# Patient Record
Sex: MALE | Race: WHITE | NOT HISPANIC OR LATINO | Employment: OTHER | URBAN - METROPOLITAN AREA
[De-identification: names, ages, dates, MRNs, and addresses within clinical notes are randomized per-mention and may not be internally consistent; named-entity substitution may affect disease eponyms.]

---

## 2018-02-28 ENCOUNTER — MEDICAL CORRESPONDENCE (OUTPATIENT)
Dept: HEALTH INFORMATION MANAGEMENT | Facility: CLINIC | Age: 63
End: 2018-02-28

## 2018-03-16 ENCOUNTER — TRANSFERRED RECORDS (OUTPATIENT)
Dept: HEALTH INFORMATION MANAGEMENT | Facility: CLINIC | Age: 63
End: 2018-03-16

## 2018-04-03 ENCOUNTER — TRANSFERRED RECORDS (OUTPATIENT)
Dept: HEALTH INFORMATION MANAGEMENT | Facility: CLINIC | Age: 63
End: 2018-04-03

## 2018-04-05 ENCOUNTER — OFFICE VISIT (OUTPATIENT)
Dept: NEUROSURGERY | Facility: CLINIC | Age: 63
End: 2018-04-05
Attending: NURSE PRACTITIONER
Payer: OTHER MISCELLANEOUS

## 2018-04-05 VITALS
DIASTOLIC BLOOD PRESSURE: 82 MMHG | OXYGEN SATURATION: 93 % | HEART RATE: 84 BPM | HEIGHT: 71 IN | BODY MASS INDEX: 32.9 KG/M2 | WEIGHT: 235 LBS | SYSTOLIC BLOOD PRESSURE: 135 MMHG

## 2018-04-05 DIAGNOSIS — M54.16 LUMBAR RADICULAR PAIN: Primary | ICD-10-CM

## 2018-04-05 PROCEDURE — 99243 OFF/OP CNSLTJ NEW/EST LOW 30: CPT | Performed by: NURSE PRACTITIONER

## 2018-04-05 PROCEDURE — G0463 HOSPITAL OUTPT CLINIC VISIT: HCPCS | Performed by: NURSE PRACTITIONER

## 2018-04-05 RX ORDER — MULTIPLE VITAMINS W/ MINERALS TAB 9MG-400MCG
1 TAB ORAL DAILY
COMMUNITY

## 2018-04-05 ASSESSMENT — PAIN SCALES - GENERAL: PAINLEVEL: MILD PAIN (2)

## 2018-04-05 NOTE — PROGRESS NOTES
Dr. Santino Epstein  [facility]  Neurosurgery Clinic Visit          CC: low back and left leg pain    Primary care Provider: Rayn Berg      Reason For Visit:   I was asked by Dr. Don to consult on the patient for lumbar radicular pain on the left.      HPI: Isaiah Miller is a 62 year old male with lumbar radicular pain on the left. He reports that it began on 2- when he was at work lifting a lateral file cabinet into a truck and he felt the pain.  He did finish out the work day.  He had progressive back and leg pain and went to see his PCP and a MRI was ordered.  He was then sent to PT which has helped somewhat.   He states that the back pain is more of a dull ache and the numbness of his leg is the most bothersome.  He notes numbness to the lateral portion of his entire left leg to the foot. At times the leg feels weak.  He has not had injections for his pain.      Pain at its worst 10  Pain right now:  2    History reviewed. No pertinent past medical history.    Past Medical History reviewed with patient during visit.    History reviewed. No pertinent surgical history.  Past Surgical History reviewed with patient during visit.    Current Outpatient Prescriptions   Medication     SIMVASTATIN PO     Metoprolol Succinate (TOPROL XL PO)     metoprolol-hydrochlorothiazide (DUTOPROL) 25-12.5 MG TB24 per tablet     multivitamin, therapeutic with minerals (MULTI-VITAMIN) TABS tablet     ASPIRIN PO     No current facility-administered medications for this visit.        Allergies   Allergen Reactions     Tamiflu [Oseltamivir] Rash       Social History     Social History     Marital status:      Spouse name: N/A     Number of children: N/A     Years of education: N/A     Social History Main Topics     Smoking status: Never Smoker     Smokeless tobacco: Never Used     Alcohol use None     Drug use: None     Sexual activity: Not Asked     Other Topics Concern     None      Social History Narrative     None       History reviewed. No pertinent family history.      Review Of Systems  Skin: negative  Eyes: negative  Ears/Nose/Throat: negative  Respiratory: No shortness of breath, dyspnea on exertion, cough, or hemoptysis  Cardiovascular: HTN/HLD  Gastrointestinal: negative  Genitourinary: negative  Musculoskeletal: back pain  Neurologic: left leg numbness  Psychiatric: negative  Hematologic/Lymphatic/Immunologic: negative  Endocrine: negative     ROS: 10 point ROS neg other than the symptoms noted above in the HPI.      Vital Signs: There were no vitals taken for this visit.    Examination:  Constitutional:  Alert, well nourished, NAD.  Memory: recent and remote memory intact  HEENT: Normocephalic, atraumatic.   Pulm:  Without shortness of breath   CV:  No pitting edema of BLE.    Neurological:  Awake  Alert  Oriented x 3  Speech clear  Cranial nerves II - XII intact  PERRL  EOMI  Face symmetric  Tongue midline  Motor exam   Shoulder Abduction:  Right:  5/5   Left:  5/5  Biceps:                      Right:  5/5   Left:  5/5  Triceps:                     Right:  5/5   Left:  5/5  Wrist Extensors:       Right:  5/5   Left:  5/5  Wrist Flexors:           Right:  5/5   Left:  5/5  Intrinsics:                   Right:  5/5   Left:  5/5   Hip Flexor:                Right: 5/5  Left:  5/5  Hip Adductor:             Right:  5/5  Left:  5/5  Hip Abductor:             Right:  5/5  Left:  5/5  Gastroc Soleus:        Right:  5/5  Left:  5/5  Tib/Ant:                      Right:  5/5  Left:  5/5  EHL:                          Right:  5/5  Left:  5/5   Sensation normal to bilateral upper and lower extremities  Muscle tone to bilateral upper and lower extremities  Normal   Gait: Able to stand from a seated position. Normal non-antalgic, non-myelopathic gait.  Able to heel/toe walk without loss of balance    Lumbar examination reveals no tenderness of the spine or paraspinous muscles. Restricted and  painful ROM in all planes.    Hip height is symmetrical. Negative SI joint, sciatic notch or greater trochanteric tenderness to palpation bilaterally.  Straight leg raise is positive on the left.      Imaging:     Lumbar MRI:    2-      1.  New L2-3 central disc extrusion which results in mild spinal canal stenosis.     2. Improvement in the L4-5 left foraminal disc extrusion on the prior exam with now moderate to severe foraminal stenosis.     3. Continued severe left foraminal stenosis at L5-S1.     Assessment/Plan:   Isaiah Miller is a 62 year old male with lumbar radicular pain on the left. He reports that it began on 2- when he was at work lifting a lateral file cabinet into a truck and he felt the pain.  He did finish out the work day.  He had progressive back and leg pain and went to see his PCP and a MRI was ordered.  He was then sent to PT which has helped somewhat.   He states that the back pain is more of a dull ache and the numbness of his leg is the most bothersome.  He notes numbness to the lateral portion of his entire left leg to the foot. At times the leg feels weak.  He has not had injections for his pain.  The pt is neurologically intact. His lumbar MRI was reviewed in detail.  The pts pain pattern does correlate with his lumbar MRI. He is feeling better he notes. At this time it was recommended he try injection therapy. He is open to this.      Patient Instructions   1. Please schedule your injection. Someone will contact you from the pain clinic within 24 hours to schedule.      2. Please contact the clinic if pain persists at [phone].     3.  Continue physical therapy          Nat Parr [facility]  [facility]  [address]  [address] [place] [address]    Tel [phone]  Pager [phone]

## 2018-04-05 NOTE — LETTER
4/5/2018         RE: Isaiah Miller  [address] [facility] [address]        Dear Colleague,    Thank you for referring your patient, Isaiah Miller, to the [facility]. Please see a copy of my visit note below.    Dr. Santino Epstein  [facility]  Neurosurgery Clinic Visit          CC: low back and left leg pain    Primary care Provider: Ryan Berg      Reason For Visit:   I was asked by Dr. Don to consult on the patient for lumbar radicular pain on the left.      HPI: Isaiah Miller is a 62 year old male with lumbar radicular pain on the left. He reports that it began on 2- when he was at work lifting a lateral file cabinet into a truck and he felt the pain.  He did finish out the work day.  He had progressive back and leg pain and went to see his PCP and a MRI was ordered.  He was then sent to PT which has helped somewhat.   He states that the back pain is more of a dull ache and the numbness of his leg is the most bothersome.  He notes numbness to the lateral portion of his entire left leg to the foot. At times the leg feels weak.  He has not had injections for his pain.      Pain at its worst 10  Pain right now:  2    History reviewed. No pertinent past medical history.    Past Medical History reviewed with patient during visit.    History reviewed. No pertinent surgical history.  Past Surgical History reviewed with patient during visit.    Current Outpatient Prescriptions   Medication     SIMVASTATIN PO     Metoprolol Succinate (TOPROL XL PO)     metoprolol-hydrochlorothiazide (DUTOPROL) 25-12.5 MG TB24 per tablet     multivitamin, therapeutic with minerals (MULTI-VITAMIN) TABS tablet     ASPIRIN PO     No current facility-administered medications for this visit.        Allergies   Allergen Reactions     Tamiflu [Oseltamivir] Rash       Social History     Social History     Marital status:      Spouse  name: N/A     Number of children: N/A     Years of education: N/A     Social History Main Topics     Smoking status: Never Smoker     Smokeless tobacco: Never Used     Alcohol use None     Drug use: None     Sexual activity: Not Asked     Other Topics Concern     None     Social History Narrative     None       History reviewed. No pertinent family history.      Review Of Systems  Skin: negative  Eyes: negative  Ears/Nose/Throat: negative  Respiratory: No shortness of breath, dyspnea on exertion, cough, or hemoptysis  Cardiovascular: HTN/HLD  Gastrointestinal: negative  Genitourinary: negative  Musculoskeletal: back pain  Neurologic: left leg numbness  Psychiatric: negative  Hematologic/Lymphatic/Immunologic: negative  Endocrine: negative     ROS: 10 point ROS neg other than the symptoms noted above in the HPI.      Vital Signs: There were no vitals taken for this visit.    Examination:  Constitutional:  Alert, well nourished, NAD.  Memory: recent and remote memory intact  HEENT: Normocephalic, atraumatic.   Pulm:  Without shortness of breath   CV:  No pitting edema of BLE.    Neurological:  Awake  Alert  Oriented x 3  Speech clear  Cranial nerves II - XII intact  PERRL  EOMI  Face symmetric  Tongue midline  Motor exam   Shoulder Abduction:  Right:  5/5   Left:  5/5  Biceps:                      Right:  5/5   Left:  5/5  Triceps:                     Right:  5/5   Left:  5/5  Wrist Extensors:       Right:  5/5   Left:  5/5  Wrist Flexors:           Right:  5/5   Left:  5/5  Intrinsics:                   Right:  5/5   Left:  5/5   Hip Flexor:                Right: 5/5  Left:  5/5  Hip Adductor:             Right:  5/5  Left:  5/5  Hip Abductor:             Right:  5/5  Left:  5/5  Gastroc Soleus:        Right:  5/5  Left:  5/5  Tib/Ant:                      Right:  5/5  Left:  5/5  EHL:                          Right:  5/5  Left:  5/5   Sensation normal to bilateral upper and lower extremities  Muscle tone to  bilateral upper and lower extremities  Normal   Gait: Able to stand from a seated position. Normal non-antalgic, non-myelopathic gait.  Able to heel/toe walk without loss of balance    Lumbar examination reveals no tenderness of the spine or paraspinous muscles. Restricted and painful ROM in all planes.    Hip height is symmetrical. Negative SI joint, sciatic notch or greater trochanteric tenderness to palpation bilaterally.  Straight leg raise is positive on the left.      Imaging:     Lumbar MRI:    2-      1.  New L2-3 central disc extrusion which results in mild spinal canal stenosis.     2. Improvement in the L4-5 left foraminal disc extrusion on the prior exam with now moderate to severe foraminal stenosis.     3. Continued severe left foraminal stenosis at L5-S1.     Assessment/Plan:   Isaiah Miller is a 62 year old male with lumbar radicular pain on the left. He reports that it began on 2- when he was at work lifting a lateral file cabinet into a truck and he felt the pain.  He did finish out the work day.  He had progressive back and leg pain and went to see his PCP and a MRI was ordered.  He was then sent to PT which has helped somewhat.   He states that the back pain is more of a dull ache and the numbness of his leg is the most bothersome.  He notes numbness to the lateral portion of his entire left leg to the foot. At times the leg feels weak.  He has not had injections for his pain.  The pt is neurologically intact. His lumbar MRI was reviewed in detail.  The pts pain pattern does correlate with his lumbar MRI. He is feeling better he notes. At this time it was recommended he try injection therapy. He is open to this.      Patient Instructions   1. Please schedule your injection. Someone will contact you from the pain clinic within 24 hours to schedule.      2. Please contact the clinic if pain persists at [phone].     3.  Continue physical therapy          Nat Parr  CNP  [facility]  [address] [address]    Tel [phone]  Pager [phone]      Again, thank you for allowing me to participate in the care of your patient.        Sincerely,        SURYA Mcnulty CNP

## 2018-04-05 NOTE — NURSING NOTE
"Isaiah Miller is a 62 year old male who presents for:  Chief Complaint   Patient presents with     Neurologic Problem     Low back pain which started in Feb while he was lifting something from his work truck         Initial Vitals:  /82 (BP Location: Right arm, Patient Position: Sitting, Cuff Size: Adult Large)  Pulse 84  Ht 5' 10.5\" (1.791 m)  Wt 235 lb (106.6 kg)  SpO2 93%  BMI 33.24 kg/m2 Estimated body mass index is 33.24 kg/(m^2) as calculated from the following:    Height as of this encounter: 5' 10.5\" (1.791 m).    Weight as of this encounter: 235 lb (106.6 kg).. Body surface area is 2.3 meters squared. BP completed using cuff size: large  Mild Pain (2)    Do you feel safe in your environment?  Yes  Do you need any refills today? No    Nursing Comments: Low back pain which started in Feb while he was lifting something from his work truck.        5 min. nursing intake time  Bethany Roach MA       Discharge plan: 1. Please schedule your injection. Someone will contact you from the pain clinic within 24 hours to schedule.      2. Please contact the clinic if pain persists at [phone].     3.  Continue physical therapy   2 min. nursing discharge time  Bethany Roach MA        "

## 2018-04-05 NOTE — MR AVS SNAPSHOT
After Visit Summary   4/5/2018    Isaiah Miller    MRN: [id number]           Patient Information     Date Of Birth          1955        Visit Information        Provider Department      4/5/2018 9:00 AM Nat Parr APRN CNP [facility]        Today's Diagnoses     Lumbar radicular pain    -  1      Care Instructions    1. Please schedule your injection. Someone will contact you from the pain clinic within 24 hours to schedule.      2. Please contact the clinic if pain persists at [phone].     3.  Continue physical therapy           Follow-ups after your visit        Additional Services     PAIN MANAGEMENT REFERRAL       Your provider has referred you to: [facility]: [facility] -  Dr. Roth   Reason for Referral: Procedure Order Epidural:  Lumbar (Advanced imaging required in the last 3 years)      What is your diagnosis for the patient's pain? Left leg and back pain       For any questions, contact the [facility] at [phone].     **ANY DIAGNOSTIC TESTS THAT ARE NOT IN EPIC SHOULD BE SENT TO THE PAIN CENTER**    REGARDING OPIOID MEDICATIONS:  The discussion of opioids management, appropriateness of therapy, and dosing will be discussed in patients being seen for evaluation.  The pain management clinics are not long-term prescribing clinics, with transition of prescribing of medications ultimately going back to the referring provider/PCP.  If prescribing is taken over at the pain clinic, it is in actively involved patients whom are appropriate for opioids, urine drug screening is completed, and long-term prescribing plan has been determined.  Therefore, we will not be automatically taking over prescribing at the patient's first visit.  Is this agreeable to you? agrees.     Please be aware that coverage of these services is subject to the terms and limitations of your health insurance plan.  Call member services at your  "health plan with any benefit or coverage questions.      Please bring the following with you to your appointment:    (1) Any X-Rays, CTs or MRIs which have been performed.  Contact the facility where they were done to arrange for  prior to your scheduled appointment.    (2) List of current medications   (3) This referral request   (4) Any documents/labs given to you for this referral                  Who to contact     If you have questions or need follow up information about today's clinic visit or your schedule please contact [facility] directly at [phone].  Normal or non-critical lab and imaging results will be communicated to you by [employer], letter or phone within 4 business days after the clinic has received the results. If you do not hear from us within 7 days, please contact the clinic through [employer] or phone. If you have a critical or abnormal lab result, we will notify you by phone as soon as possible.  Submit refill requests through [employer] or call your pharmacy and they will forward the refill request to us. Please allow 3 business days for your refill to be completed.          Additional Information About Your Visit        MyChart Information     [employer] lets you send messages to your doctor, view your test results, renew your prescriptions, schedule appointments and more. To sign up, go to [email or web address] . Click on \"Log in\" on the left side of the screen, which will take you to the Welcome page. Then click on \"Sign up Now\" on the right side of the page.     You will be asked to enter the access code listed below, as well as some personal information. Please follow the directions to create your username and password.     Your access code is: [email or web address]  Expires: 2018  9:18 AM     Your access code will  in 90 days. If you need help or a new code, please call your [facility] clinic or [phone].        Care EveryWhere ID     This is your Care " "EveryWhere ID. This could be used by other organizations to access your [facility] medical records  [id number]        Your Vitals Were     Height BMI (Body Mass Index)                5' 10.5\" (1.791 m) 33.24 kg/m2           Blood Pressure from Last 3 Encounters:   No data found for BP    Weight from Last 3 Encounters:   04/05/18 235 lb (106.6 kg)              We Performed the Following     PAIN MANAGEMENT REFERRAL        Primary Care Provider Office Phone # Fax #    Ryan Berg [phone]       [facility] [address] [facility] [address]        Equal Access to Services     [facility] Center[facility] : Hadii aad ku hadasho Soomaali, waaxda luqadaha, qaybta kaalmada adeegyada, [facility] hayaan adeswathi wall . So [facility] [phone].    ATENCIÓN: Si habla español, tiene a way disposición servicios gratuitos de asistencia lingüística. Wolfgang al [phone].    We comply with applicable federal civil rights laws and [place] laws. We do not discriminate on the basis of race, color, national origin, age, disability, sex, sexual orientation, or gender identity.            Thank you!     Thank you for choosing [facility]  for your care. Our goal is always to provide you with excellent care. Hearing back from our patients is one way we can continue to improve our services. Please take a few minutes to complete the written survey that you may receive in the mail after your visit with us. Thank you!             Your Updated Medication List - Protect others around you: Learn how to safely use, store and throw away your medicines at www.disposemymeds.org.          This list is accurate as of 4/5/18  9:18 AM.  Always use your most recent med list.                   Brand Name Dispense Instructions for use Diagnosis    ASPIRIN PO      Take 20 mg by mouth        metoprolol-hydrochlorothiazide 25-12.5 MG Tb24 per tablet    DUTOPROL     Take 1 tablet by mouth daily     "    Multi-vitamin Tabs tablet      Take 1 tablet by mouth daily        SIMVASTATIN PO      Take 40 mg by mouth At Bedtime        TOPROL XL PO      Take 25 mg by mouth daily

## 2018-04-05 NOTE — PATIENT INSTRUCTIONS
1. Please schedule your injection. Someone will contact you from the pain clinic within 24 hours to schedule.      2. Please contact the clinic if pain persists at [phone].     3.  Continue physical therapy

## 2018-04-06 ENCOUNTER — TELEPHONE (OUTPATIENT)
Dept: PALLIATIVE MEDICINE | Facility: CLINIC | Age: 63
End: 2018-04-06

## 2018-04-06 NOTE — TELEPHONE ENCOUNTER
Pre-screening Questions for Radiology Injections:    Injection to be done at which interventional clinic site? [facility]    Procedure ordered by Nat Parr    Procedure ordered? Lumbar Epidural Steroid Injection    What insurance would patient like us to bill for this procedure? Work Comp      Worker's comp or MVA (motor vehicle accident) -Any injection DO NOT SCHEDULE and route to Alicia Burch.      [employer] insurance - For SI joint injections, DO NOT SCHEDULE and route [email or web address] NO PA REQUIRED EFFECTIVE 11/1/2017      [employer]- MBB's must be scheduled at LEAST two weeks apart      [employer] - Any injection besides hip/shoulder/knee joint DO NOT SCHEDULE and route to Jazz Mckeon. She will obtain PA and call pt back to schedule procedure or notify pt of denial.       HP [employer]-PA REQUIRED FOR NON-ADAM OR Joint injections    Any chance of pregnancy? Not Applicable   If YES, do NOT schedule and route to RN pool    Is an  needed? No     Patient has a drive home? (mandatory) YES:     Is patient taking any blood thinners (plavix, coumadin, jantoven, warfarin, heparin, pradaxa or dabigatran )? No   If hold needed, do NOT schedule, route to RN pool     Is patient taking any aspirin products? Yes - Pt takes 81mg daily; instructed to hold 0 day(s) prior to procedure.      If more than 325mg/day do NOT schedule; route to RN pool     For CERVICAL procedures, hold all aspirin products for 6 days.      Does the patient have a bleeding or clotting disorder? No     If YES, okay to schedule AND route to RN nurse pool    **For any patients with platelet count <100, must be forwarded to provider**    Is patient diabetic?  No  If YES, have them bring their glucometer.    Does patient have an active infection or treated for one within the past week? No     Is patient currently taking any antibiotics?  No     For patients on chronic, preventative, or prophylactic  antibiotics, procedures may be scheduled.     For patients on antibiotics for active or recent infection:    Ravinder Lopez Burton, [place]-antibiotic course must have been completed for 4 days    Dr. Siegel-antibiotic course must have been completed for 7 days    Is patient currently taking any steroid medications? (i.e. Prednisone, Medrol)  No     For patients on steroid medications:    Ravinder Lopez Burton, Snitzer-steroid course must have been completed for 4 days    -steroid course must have been completed for 7 days    Reviewed with patient:  If you are started on any steroids or antibiotics between now and your appointment, you must contact us because it may affect our ability to perform your procedure.  Yes    Is patient actively being treated for cancer or immunocompromised? No  If YES, do NOT schedule and route to RN pool     Are you able to get on and off an exam table with minimal or no assistance? Yes  If NO, do NOT schedule and route to RN pool    Are you able to roll over and lay on your stomach with minimal or no assistance? Yes  If NO, do NOT schedule and route to RN pool     Any allergies to contrast dye, iodine, shellfish, or numbing and steroid medications? No  If YES, route to RN pool AND add allergy information to appointment notes    Allergies: Tamiflu [oseltamivir]      Has the patient had a flu shot or any other vaccinations within 7 days before or after the procedure.  No     Does patient have an MRI/CT?  YES: 2/26/18  (SI joint, hip injections, lumbar sympathetic blocks, and stellate ganglion blocks do not require an MRI)    Was the MRI done w/in the last 3 years?  Yes    Was MRI done at [facility]? No      If not, where was it done? [facility] in [place] (PT WILL BRING RECORDS TO [facility] [place])       If MRI was not done at [facility], [facility] do NOT schedule and route to nursing.  If pt has an imaging disc, the injection may be  scheduled but pt has to bring disc to appt. If they show up w/out disc the injection cannot be done    Reminders (please tell patient if applicable):       Instructed pt to arrive 30 minutes early for IV start if this is for a cervical procedure, ALL sympathetic (stellate ganglion, hypogastric, or lumbar sympathetic block) and all sedation procedures (RFA, spinal cord stimulation trials).  Not Applicable   -IVs are not routinely placed for Dr. Roth cervical cases   -Dr. Mejias: IVs for cervical ESIs and cervical TBDs (not CMBBs/facet inj)      If NPO for sedation, informed patient that it is okay to take medications with sips of water (except if they are to hold blood thinners).  Not Applicable   *DO take blood pressure medication if it is prescribed*      If this is for a cervical ADAM, informed patient that aspirin needs to be held for 6 days.   NO      For all patients not having spinal cord stimulator (SCS) trials or radiofrequency ablations (RFAs), informed patient:    IV sedation is not provided for this procedure.  If you feel that an oral anti-anxiety medication is needed, you can discuss this further with your referring provider or primary care provider.  The Pain Clinic provider will discuss specifics of what the procedure includes at your appointment.  Most procedures last 10-20 minutes.  We use numbing medications to help with any discomfort during the procedure.  NO      Do not schedule procedures requiring IV placement in the first appointment of the day or first appointment after lunch.       For patients 85 or older we recommend having an adult stay w/ them for the remainder of the day.       Does the patient have any questions?  NO  Lisbet Brush  [facility]

## 2018-04-09 NOTE — TELEPHONE ENCOUNTER
Jacinda lobo [facility] requesting a a copy of order to be faxed to [phone]. Her call back number is [phone]

## 2018-04-13 ENCOUNTER — TELEPHONE (OUTPATIENT)
Dept: NEUROSURGERY | Facility: CLINIC | Age: 63
End: 2018-04-13

## 2018-04-13 NOTE — TELEPHONE ENCOUNTER
Called Jacinda from [employer] back requesting info on wc . Need fax number from her as well. Waiting for call back.    Alicia Burch    [facility]

## 2018-04-13 NOTE — TELEPHONE ENCOUNTER
REASON FOR CALL:  Jacinda -  from [facility] - called requesting a copy of the injection order to be faxed to her at [phone]. It needs to be reviewed prior to the patient being ok to schedule.     Detailed message can be left:  YES

## 2018-04-16 ENCOUNTER — DOCUMENTATION ONLY (OUTPATIENT)
Dept: NEUROSURGERY | Facility: CLINIC | Age: 63
End: 2018-04-16

## 2018-04-16 NOTE — PROGRESS NOTES
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

## 2018-04-19 NOTE — TELEPHONE ENCOUNTER
Received authorization for JANUSZ. Patient is scheduled.     Alicia Burch    [facility] Management Clinic

## 2018-04-25 NOTE — PROGRESS NOTES
[facility] - Procedure Note    Date of Visit: 4/26/2018    Procedure performed: Lumbar L5-S1 interlaminar epidural steroid injection  Diagnosis: Lumbar spondylosis; Lumbar radiculitis/radiculopathy  : Nichole Roth MD & Gustavo Watts DO (pain fellow)   Anesthesia: none    Indications: Isaiah Miller is a 62 year old male who is seen at the request of Nat Parr CNP for a lumbar epidural steroid injection. The patient describes low back pain that has been present since February. The patient has been exhibiting symptoms consistent with lumbar intraspinal inflammation and radiculopathy. Symptoms have been persistent, disabling, and intermittently severe. The patient reports minimal improvement with conservative treatment, including physical therapy and oral medications. He has not had an injection before.    Lumbar MRI was done on 2/26/2018 which showed      1.  New L2-3 central disc extrusion which results in mild spinal canal stenosis.      2. Improvement in the L4-5 left foraminal disc extrusion on the prior exam with now moderate to severe foraminal stenosis.      3. Continued severe left foraminal stenosis at L5-S1    Allergies:      Allergies   Allergen Reactions     Tamiflu [Oseltamivir] Rash        Vitals:  BP (!) 142/99  Pulse 74  SpO2 95%    Review of Systems: The patient denies recent fever, chills, illness, use of antibiotics or anticoagulants. All other 10-point review of systems negative.     Procedure: The procedure and risks were explained, and informed written consent was obtained from the patient. Risks include but are not limited to: infection, bleeding, increased pain, and damage to soft tissue, nerve, muscle, and vasculature structures. After getting informed consent, patient was brought into the procedure suite and was placed in a prone position on the procedure table. A Pause for the Cause was performed. Patient was prepped and draped in sterile fashion.      The T9/10 interspace was identified with use of fluoroscopy in AP view. A 25-gauge, 1.5 inch needle was used to anesthetize the skin and subcutaneous tissue entry site with a total of 2 ml of 1% lidocaine. Under fluoroscopic visualization, a 22-gauge, 4.5 inch Tuohy epidural needle was slowly advanced towards the epidural space a few millimeters left of midline. The latter part of the needle advancement was guided with fluoroscopy in the lateral view. The epidural space was identified using loss of resistance technique. After negative aspiration for heme and cerebrospinal fluid, a total of 1 mL of non-ionic contrast was injected to confirm needle placement with 9 mL of contrast wasted. Epidurogram confirmed spread within the posterior epidural space. 2 ml of 40mg/ml of triamcinolone, 2 ml of 1% lidocaine, and 1 ml of preservative free saline was injected. The needle was removed.  Images were saved to PACS.    The patient tolerated the procedure well, and there was no evidence of procedural complications. No new sensory or motor deficits were noted following the procedure. The patient was stable and able to ambulate on discharge home. Post-procedure instructions were provided.     Pre-procedure pain score: 2/10 in the back, 2/10 in the leg  Post-procedure pain score: 2/10 in the back, 2/10 in the leg    Assessment/Plan: Isaiah Miller is a 62 year old male s/p lumbar interlaminar epidural steroid injection today for lumbar spondylosis and radiculitis/radiculopathy.     1. Following today's procedure, the patient was advised to contact the [facility] for any of the following:   Fever, chills, or night sweats   New onset of pain, numbness, or weakness   Any questions/concerns regarding the procedure  If unable to contact the Pain Center, the patient was instructed to go to a local Emergency Room for any complications.   2. The patient will receive a follow-up call in 1 week.   3. Follow-up  with the referring provider in 2 weeks for post-procedure evaluation.      Nichole Roth MD   [facility] [place]

## 2018-04-26 ENCOUNTER — RADIANT APPOINTMENT (OUTPATIENT)
Dept: GENERAL RADIOLOGY | Facility: CLINIC | Age: 63
End: 2018-04-26
Attending: ANESTHESIOLOGY
Payer: OTHER MISCELLANEOUS

## 2018-04-26 ENCOUNTER — RADIOLOGY INJECTION OFFICE VISIT (OUTPATIENT)
Dept: PALLIATIVE MEDICINE | Facility: CLINIC | Age: 63
End: 2018-04-26
Payer: OTHER MISCELLANEOUS

## 2018-04-26 VITALS — OXYGEN SATURATION: 95 % | HEART RATE: 74 BPM | DIASTOLIC BLOOD PRESSURE: 99 MMHG | SYSTOLIC BLOOD PRESSURE: 142 MMHG

## 2018-04-26 DIAGNOSIS — M54.16 LUMBAR RADICULOPATHY: Primary | ICD-10-CM

## 2018-04-26 DIAGNOSIS — M54.16 LUMBAR RADICULAR PAIN: ICD-10-CM

## 2018-04-26 PROCEDURE — 62323 NJX INTERLAMINAR LMBR/SAC: CPT | Performed by: ANESTHESIOLOGY

## 2018-04-26 NOTE — NURSING NOTE
Discharge Information    IV Discontiued Time:  NA    Amount of Fluid Infused:  NA    Discharge Criteria = When patient returns to baseline or as per MD order    Consciousness:  Pt is fully awake    Circulation:  BP +/- 20% of pre-procedure level    Respiration:  Patient is able to breathe deeply    O2 Sat:  Patient is able to maintain O2 Sat >92% on room air    Activity:  Moves 4 extremities on command    Ambulation:  Patient is able to stand and walk or stand and pivot into wheelchair    Dressing:  Clean/dry or No Dressing    Notes:   Discharge instructions and AVS given to patient    Patient meets criteria for discharge?  YES    Admitted to PCU?  No    Responsible adult present to accompany patient home?  Yes    Signature/Title:    Miryam Dean RN Care Coordinator  [facility]

## 2018-04-26 NOTE — PATIENT INSTRUCTIONS
[facility] Procedure Discharge Instructions    Today you saw:   Dr. Nichole Watts    Your procedure: Interlaminar Epidural steroid injection      Medications used:  Lidocaine (anesthetic)  Kenalog (steroid)  Omnipaque (contrast)              Be cautious when walking as numbness and/or weakness in the legs may occur up to 6-8 hours after the procedure due to effect of the local anesthetic    Do not drive for 6 hours. The effect of the local anesthetic could slow your reflexes.     Avoid strenuous activity for the first 24 hours. You may resume your regular activities after that.     You may shower, however avoid swimming, tub baths or hot tubs for 24 hours following your procedure    You may have a mild to moderate increase in pain for several days following the injection.      You may use ice packs for 10-15 minutes, 3 to 4 times a day at the injection site for comfort    Do not use heat to painful areas for 6 to 8 hours. This will give the local anesthetic time to wear off and prevent you from accidentally burning your skin.    You may use anti-inflammatory medications (such as Ibuprofen/Advil or Aleve) or Tylenol for pain control if necessary    With diabetes, check your blood sugar more frequently than usual as your blood sugar may be higher than normal for 10-14 days following a steroid injection. Contact your doctor who manages your diabetes if your blood sugar is higher than usual    It may take up to 14 days for the steroid medication to start working although you may feel the effect as early as a few days after the procedure.     Follow up with your referring provider in 2-3 weeks      If you experience any of the following, call the pain center line during work hours at [phone] or on-call physician after hours at [phone]:  -Fever over 100 degree F  -Swelling, bleeding, redness, drainage, warmth at the injection site  -Progressive weakness or numbness in your legs or  arms  -Loss of bowel or bladder function  -Unusual headache that is not relieved by Tylenol or your regular headache medication  -Unusual new onset of pain that is not improving    Phone #s:  Nurse triage line for general questions: [phone]

## 2018-04-26 NOTE — MR AVS SNAPSHOT
After Visit Summary   4/26/2018    Isaiah Miller    MRN: [id number]           Patient Information     Date Of Birth          1955        Visit Information        Provider Department      4/26/2018 8:45 AM Nichole Roth MD [facility]        Care Instructions    [facility] Procedure Discharge Instructions    Today you saw:   Dr. Nichole Watts    Your procedure: Interlaminar Epidural steroid injection      Medications used:  Lidocaine (anesthetic)  Kenalog (steroid)  Omnipaque (contrast)              Be cautious when walking as numbness and/or weakness in the legs may occur up to 6-8 hours after the procedure due to effect of the local anesthetic    Do not drive for 6 hours. The effect of the local anesthetic could slow your reflexes.     Avoid strenuous activity for the first 24 hours. You may resume your regular activities after that.     You may shower, however avoid swimming, tub baths or hot tubs for 24 hours following your procedure    You may have a mild to moderate increase in pain for several days following the injection.      You may use ice packs for 10-15 minutes, 3 to 4 times a day at the injection site for comfort    Do not use heat to painful areas for 6 to 8 hours. This will give the local anesthetic time to wear off and prevent you from accidentally burning your skin.    You may use anti-inflammatory medications (such as Ibuprofen/Advil or Aleve) or Tylenol for pain control if necessary    With diabetes, check your blood sugar more frequently than usual as your blood sugar may be higher than normal for 10-14 days following a steroid injection. Contact your doctor who manages your diabetes if your blood sugar is higher than usual    It may take up to 14 days for the steroid medication to start working although you may feel the effect as early as a few days after the procedure.     Follow up with your referring provider in 2-3  "weeks      If you experience any of the following, call the pain center line during work hours at [phone] or on-call physician after hours at [phone]:  -Fever over 100 degree F  -Swelling, bleeding, redness, drainage, warmth at the injection site  -Progressive weakness or numbness in your legs or arms  -Loss of bowel or bladder function  -Unusual headache that is not relieved by Tylenol or your regular headache medication  -Unusual new onset of pain that is not improving    Phone #s:  Nurse triage line for general questions: [phone]          Follow-ups after your visit        Who to contact     If you have questions or need follow up information about today's clinic visit or your schedule please contact [facility] directly at [phone].  Normal or non-critical lab and imaging results will be communicated to you by [employer], letter or phone within 4 business days after the clinic has received the results. If you do not hear from us within 7 days, please contact the clinic through [employer] or phone. If you have a critical or abnormal lab result, we will notify you by phone as soon as possible.  Submit refill requests through [employer] or call your pharmacy and they will forward the refill request to us. Please allow 3 business days for your refill to be completed.          Additional Information About Your Visit        [employer] Information     [employer] lets you send messages to your doctor, view your test results, renew your prescriptions, schedule appointments and more. To sign up, go to [email or web address] . Click on \"Log in\" on the left side of the screen, which will take you to the Welcome page. Then click on \"Sign up Now\" on the right side of the page.     You will be asked to enter the access code listed below, as well as some personal information. Please follow the directions to create your username and password.     Your access code is: [email or web address]  Expires: 7/4/2018  9:18 AM   "   Your access code will  in 90 days. If you need help or a new code, please call your [facility] clinic or [phone].        Care EveryWhere ID     This is your Care EveryWhere ID. This could be used by other organizations to access your [facility] medical records  [id number]        Your Vitals Were     Pulse Pulse Oximetry                78 96%           Blood Pressure from Last 3 Encounters:   18 150/84   18 135/82    Weight from Last 3 Encounters:   18 106.6 kg (235 lb)              Today, you had the following     No orders found for display       Primary Care Provider Office Phone # Fax #    Ryan Berg [phone]       [facility] [address] [facility] [place] [address]        Equal Access to Services     HALEIGH REYES : Hadii fernando balbuena hadasho Soomaali, waaxda luqadaha, qaybta kaalmada adeegyada, maxwell wall . So [facility] [phone].    ATENCIÓN: Si habla español, tiene a way disposición servicios gratuitos de asistencia lingüística. Llame al [phone].    We comply with applicable federal civil rights laws and [place] laws. We do not discriminate on the basis of race, color, national origin, age, disability, sex, sexual orientation, or gender identity.            Thank you!     Thank you for choosing [facility]  for your care. Our goal is always to provide you with excellent care. Hearing back from our patients is one way we can continue to improve our services. Please take a few minutes to complete the written survey that you may receive in the mail after your visit with us. Thank you!             Your Updated Medication List - Protect others around you: Learn how to safely use, store and throw away your medicines at www.disposemymeds.org.          This list is accurate as of 18  9:17 AM.  Always use your most recent med list.                   Brand Name Dispense Instructions for use  Diagnosis    ASPIRIN PO      Take 20 mg by mouth        metoprolol-hydrochlorothiazide 25-12.5 MG Tb24 per tablet    DUTOPROL     Take 1 tablet by mouth daily        Multi-vitamin Tabs tablet      Take 1 tablet by mouth daily        SIMVASTATIN PO      Take 40 mg by mouth At Bedtime        TOPROL XL PO      Take 25 mg by mouth daily

## 2020-08-25 PROBLEM — Z76.89 HEALTH CARE HOME: Status: ACTIVE | Noted: 2020-08-25

## 2020-08-25 PROBLEM — Z71.89 ACP (ADVANCE CARE PLANNING): Status: ACTIVE | Noted: 2020-08-25

## 2020-08-26 ENCOUNTER — OFFICE VISIT (OUTPATIENT)
Dept: FAMILY MEDICINE | Facility: CLINIC | Age: 65
End: 2020-08-26

## 2020-08-26 VITALS
OXYGEN SATURATION: 98 % | HEIGHT: 70 IN | SYSTOLIC BLOOD PRESSURE: 144 MMHG | BODY MASS INDEX: 34.07 KG/M2 | DIASTOLIC BLOOD PRESSURE: 82 MMHG | HEART RATE: 82 BPM | TEMPERATURE: 98 F | WEIGHT: 238 LBS | RESPIRATION RATE: 22 BRPM

## 2020-08-26 DIAGNOSIS — E78.2 MIXED HYPERLIPIDEMIA: ICD-10-CM

## 2020-08-26 DIAGNOSIS — Z76.89 HEALTH CARE HOME: ICD-10-CM

## 2020-08-26 DIAGNOSIS — I10 ESSENTIAL HYPERTENSION: Primary | ICD-10-CM

## 2020-08-26 DIAGNOSIS — Z71.89 ACP (ADVANCE CARE PLANNING): ICD-10-CM

## 2020-08-26 DIAGNOSIS — I25.10 ATHEROSCLEROSIS OF NATIVE CORONARY ARTERY OF NATIVE HEART WITHOUT ANGINA PECTORIS: ICD-10-CM

## 2020-08-26 LAB
BUN SERPL-MCNC: 12 MG/DL (ref 7–25)
BUN/CREATININE RATIO: 14.1 (ref 6–22)
CALCIUM SERPL-MCNC: 10.2 MG/DL (ref 8.6–10.3)
CHLORIDE SERPLBLD-SCNC: 104.4 MMOL/L (ref 98–110)
CHOLEST SERPL-MCNC: 202 MG/DL (ref 0–199)
CHOLEST/HDLC SERPL: 3 {RATIO} (ref 0–5)
CO2 SERPL-SCNC: 28.2 MMOL/L (ref 20–32)
CREAT SERPL-MCNC: 0.85 MG/DL (ref 0.7–1.18)
GLUCOSE SERPL-MCNC: 93 MG/DL (ref 60–99)
HDLC SERPL-MCNC: 63 MG/DL (ref 40–150)
HEMOGLOBIN: 13.9 G/DL (ref 13.3–17.7)
LDLC SERPL CALC-MCNC: 88 MG/DL (ref 0–130)
POTASSIUM SERPL-SCNC: 4.69 MMOL/L (ref 3.5–5.3)
SODIUM SERPL-SCNC: 141.5 MMOL/L (ref 135–146)
TRIGL SERPL-MCNC: 253 MG/DL (ref 0–149)

## 2020-08-26 PROCEDURE — 80061 LIPID PANEL: CPT | Performed by: FAMILY MEDICINE

## 2020-08-26 PROCEDURE — 85018 HEMOGLOBIN: CPT | Performed by: FAMILY MEDICINE

## 2020-08-26 PROCEDURE — 99203 OFFICE O/P NEW LOW 30 MIN: CPT | Performed by: FAMILY MEDICINE

## 2020-08-26 PROCEDURE — 36415 COLL VENOUS BLD VENIPUNCTURE: CPT | Performed by: FAMILY MEDICINE

## 2020-08-26 PROCEDURE — 80048 BASIC METABOLIC PNL TOTAL CA: CPT | Performed by: FAMILY MEDICINE

## 2020-08-26 RX ORDER — SIMVASTATIN 40 MG
40 TABLET ORAL AT BEDTIME
Qty: 90 TABLET | Refills: 1 | Status: SHIPPED | OUTPATIENT
Start: 2020-08-26

## 2020-08-26 RX ORDER — ASPIRIN 81 MG/1
81 TABLET ORAL DAILY
Status: ON HOLD | COMMUNITY
End: 2023-11-29

## 2020-08-26 RX ORDER — METOPROLOL SUCCINATE 50 MG/1
25 TABLET, EXTENDED RELEASE ORAL DAILY
Qty: 45 TABLET | Refills: 1 | Status: SHIPPED | OUTPATIENT
Start: 2020-08-26 | End: 2020-08-26 | Stop reason: DRUGHIGH

## 2020-08-26 RX ORDER — SIMVASTATIN 40 MG
TABLET ORAL
COMMUNITY
Start: 2020-07-14 | End: 2020-08-26

## 2020-08-26 RX ORDER — METOPROLOL SUCCINATE 50 MG/1
50 TABLET, EXTENDED RELEASE ORAL DAILY
Qty: 90 TABLET | Refills: 3 | Status: SHIPPED | OUTPATIENT
Start: 2020-08-26

## 2020-08-26 ASSESSMENT — ANXIETY QUESTIONNAIRES
3. WORRYING TOO MUCH ABOUT DIFFERENT THINGS: NOT AT ALL
5. BEING SO RESTLESS THAT IT IS HARD TO SIT STILL: MORE THAN HALF THE DAYS
2. NOT BEING ABLE TO STOP OR CONTROL WORRYING: SEVERAL DAYS
6. BECOMING EASILY ANNOYED OR IRRITABLE: MORE THAN HALF THE DAYS
GAD7 TOTAL SCORE: 8
1. FEELING NERVOUS, ANXIOUS, OR ON EDGE: SEVERAL DAYS
7. FEELING AFRAID AS IF SOMETHING AWFUL MIGHT HAPPEN: NOT AT ALL
IF YOU CHECKED OFF ANY PROBLEMS ON THIS QUESTIONNAIRE, HOW DIFFICULT HAVE THESE PROBLEMS MADE IT FOR YOU TO DO YOUR WORK, TAKE CARE OF THINGS AT HOME, OR GET ALONG WITH OTHER PEOPLE: SOMEWHAT DIFFICULT

## 2020-08-26 ASSESSMENT — PATIENT HEALTH QUESTIONNAIRE - PHQ9
SUM OF ALL RESPONSES TO PHQ QUESTIONS 1-9: 6
5. POOR APPETITE OR OVEREATING: MORE THAN HALF THE DAYS

## 2020-08-26 ASSESSMENT — MIFFLIN-ST. JEOR: SCORE: 1885.81

## 2020-08-26 NOTE — NURSING NOTE
Chief Complaint   Patient presents with     Recheck Medication     medication check     New Patient     new patient to this clinic     Pre-visit Screening:  Immunizations:  up to date  Colonoscopy:  is up to date  Mammogram: NA  Asthma Action Test/Plan:  NA  PHQ9:  Given today-new patient   GAD7:  Given today-new pt  Questioned patient about current smoking habits Pt. has never smoked.  Ok to leave detailed message on voice mail for today's visit only Ys, phone # [phone]

## 2020-08-26 NOTE — PROGRESS NOTES
"Subjective     Isaiah Miller is a 64 year old male who presents to clinic today for the following health issues:    HPI       Hyperlipidemia Follow-Up      Are you regularly taking any medication or supplement to lower your cholesterol?   Yes- simvistatin    Are you having muscle aches or other side effects that you think could be caused by your cholesterol lowering medication?  No    Hypertension Follow-up      Do you check your blood pressure regularly outside of the clinic? No     Are you following a low salt diet? Yes    Are your blood pressures ever more than 140 on the top number (systolic) OR more   than 90 on the bottom number (diastolic), for example 140/90? No    Vascular Disease Follow-up      How often do you take nitroglycerin? Never    Do you take an aspirin every day? Yes      How many servings of fruits and vegetables do you eat daily?  2-3    On average, how many sweetened beverages do you drink each day (Examples: soda, juice, sweet tea, etc.  Do NOT count diet or artificially sweetened beverages)?   1    How many days per week do you exercise enough to make your heart beat faster? 4    How many minutes a day do you exercise enough to make your heart beat faster? 10 - 19  How many days per week do you miss taking your medication? 1    What makes it hard for you to take your medications?  remembering to take        Review of Systems   Constitutional, HEENT, cardiovascular, pulmonary, gi and gu systems are negative, except as otherwise noted.      Objective    BP (!) 144/82 (BP Location: Left arm, Patient Position: Sitting, Cuff Size: Adult Large)   Pulse 82   Temp 98  F (36.7  C) (Oral)   Resp 22   Ht 1.778 m (5' 10\")   Wt 108 kg (238 lb)   SpO2 98%   BMI 34.15 kg/m    Body mass index is 34.15 kg/m .  Physical Exam   GENERAL: healthy, alert and no distress  NECK: no adenopathy, no asymmetry, masses, or scars and thyroid normal to palpation  RESP: lungs clear to auscultation - no rales, " rhonchi or wheezes  CV: regular rate and rhythm, normal S1 S2, no S3 or S4, no murmur, click or rub, no peripheral edema and peripheral pulses strong  ABDOMEN: soft, nontender, no hepatosplenomegaly, no masses and bowel sounds normal  MS: no gross musculoskeletal defects noted, no edema  NEURO: Normal strength and tone, mentation intact and speech normal  LYMPH: no cervical, supraclavicular, axillary, or inguinal adenopathy            (I10) Essential hypertension  (primary encounter diagnosis)  Comment: needs to restart toprol increase to 50mg / day  Plan: metoprolol succinate ER (TOPROL-XL) 50 MG 24 hr        tablet, Basic Metabolic Panel (BFP),         HEMOGLOBIN, DISCONTINUED: metoprolol succinate         ER (TOPROL XL) 50 MG 24 hr tablet            (Z71.89) ACP (advance care planning)  Comment:   Plan:     (Z76.89) Health Care Home  Comment:   Plan:     (E78.2) Mixed hyperlipidemia  Comment: recheck levels  Plan: simvastatin (ZOCOR) 40 MG tablet, Lipid Panel         (BFP)            (I25.10) Atherosclerosis of native coronary artery of native heart without angina pectoris  Comment:   Plan: simvastatin (ZOCOR) 40 MG tablet, metoprolol         succinate ER (TOPROL-XL) 50 MG 24 hr tablet,         Lipid Panel (BFP)

## 2020-08-26 NOTE — LETTER
[facility]  [address]  [address]  [facility] [address]  [phone]      August 26, 2020      Isaiah CLEVE Angela  [address] [facility] [facility] [address] [facility] [address]      EMERGENCY CARE PLAN  Presenting Problem Treatment Plan   Questions or concerns during clinic hours I will call the clinic directly:    [facility]  [address], [address]  [place] [address]  [phone]   Questions or concerns outside clinic hours  I will call the 24 hour line at [phone]   Patient needs to schedule an appointment  I will call the  scheduling line at [phone]   Same day treatment   I will call the clinic first, then  urgent care and/or  express care if needed   Clinic Care Coordinators Ifeoma Phillips RN:  [phone]  [facility] Clinical Support Staff: [phone]    Crisis Services:  Behavioral or Mental Health P ([facility])   [phone]   Emergency treatment--Immediately CALL [phone]

## 2020-08-27 ASSESSMENT — ANXIETY QUESTIONNAIRES: GAD7 TOTAL SCORE: 8

## 2021-01-14 ENCOUNTER — HEALTH MAINTENANCE LETTER (OUTPATIENT)
Age: 66
End: 2021-01-14

## 2021-04-19 ENCOUNTER — TELEPHONE (OUTPATIENT)
Dept: FAMILY MEDICINE | Facility: CLINIC | Age: 66
End: 2021-04-19

## 2021-10-24 ENCOUNTER — HEALTH MAINTENANCE LETTER (OUTPATIENT)
Age: 66
End: 2021-10-24

## 2022-02-12 ENCOUNTER — HEALTH MAINTENANCE LETTER (OUTPATIENT)
Age: 67
End: 2022-02-12

## 2022-10-10 ENCOUNTER — HEALTH MAINTENANCE LETTER (OUTPATIENT)
Age: 67
End: 2022-10-10

## 2023-10-29 ENCOUNTER — HEALTH MAINTENANCE LETTER (OUTPATIENT)
Age: 68
End: 2023-10-29

## 2023-11-28 ENCOUNTER — HOSPITAL ENCOUNTER (INPATIENT)
Facility: CLINIC | Age: 68
LOS: 1 days | Discharge: HOME | DRG: 378 | End: 2023-11-29
Admitting: INTERNAL MEDICINE
Payer: COMMERCIAL

## 2023-11-28 ENCOUNTER — APPOINTMENT (OUTPATIENT)
Dept: CT IMAGING | Facility: CLINIC | Age: 68
DRG: 378 | End: 2023-11-28
Payer: COMMERCIAL

## 2023-11-28 DIAGNOSIS — D64.9 ANEMIA, UNSPECIFIED TYPE: ICD-10-CM

## 2023-11-28 DIAGNOSIS — R07.9 CHEST PAIN, UNSPECIFIED TYPE: ICD-10-CM

## 2023-11-28 DIAGNOSIS — K92.2 UPPER GI BLEED: Primary | ICD-10-CM

## 2023-11-28 LAB
ABO/RH(D): NORMAL
ALBUMIN SERPL BCG-MCNC: 4.2 G/DL (ref 3.5–5.2)
ALP SERPL-CCNC: 50 U/L (ref 40–150)
ALT SERPL W P-5'-P-CCNC: 34 U/L (ref 0–70)
ANION GAP SERPL CALCULATED.3IONS-SCNC: 15 MMOL/L (ref 7–15)
ANTIBODY SCREEN: NEGATIVE
AST SERPL W P-5'-P-CCNC: 23 U/L (ref 0–45)
ATRIAL RATE - MUSE: 111 BPM
BASOPHILS # BLD AUTO: ABNORMAL 10*3/UL
BASOPHILS # BLD MANUAL: 0.1 10E3/UL (ref 0–0.2)
BASOPHILS NFR BLD AUTO: ABNORMAL %
BASOPHILS NFR BLD MANUAL: 1 %
BILIRUB SERPL-MCNC: 0.4 MG/DL
BUN SERPL-MCNC: 30.8 MG/DL (ref 8–23)
CALCIUM SERPL-MCNC: 8.8 MG/DL (ref 8.8–10.2)
CHLORIDE SERPL-SCNC: 103 MMOL/L (ref 98–107)
CREAT SERPL-MCNC: 0.8 MG/DL (ref 0.67–1.17)
DEPRECATED HCO3 PLAS-SCNC: 22 MMOL/L (ref 22–29)
DIASTOLIC BLOOD PRESSURE - MUSE: NORMAL MMHG
EGFRCR SERPLBLD CKD-EPI 2021: >90 ML/MIN/1.73M2
EOSINOPHIL # BLD AUTO: ABNORMAL 10*3/UL
EOSINOPHIL # BLD MANUAL: 0.1 10E3/UL (ref 0–0.7)
EOSINOPHIL NFR BLD AUTO: ABNORMAL %
EOSINOPHIL NFR BLD MANUAL: 1 %
ERYTHROCYTE [DISTWIDTH] IN BLOOD BY AUTOMATED COUNT: 14.4 % (ref 10–15)
GLUCOSE SERPL-MCNC: 149 MG/DL (ref 70–99)
HCT VFR BLD AUTO: 32.1 % (ref 40–53)
HGB BLD-MCNC: 10.4 G/DL (ref 13.3–17.7)
HGB BLD-MCNC: 8.4 G/DL (ref 13.3–17.7)
HGB BLD-MCNC: 8.9 G/DL (ref 13.3–17.7)
IMM GRANULOCYTES # BLD: ABNORMAL 10*3/UL
IMM GRANULOCYTES NFR BLD: ABNORMAL %
INTERPRETATION ECG - MUSE: NORMAL
LIPASE SERPL-CCNC: 23 U/L (ref 13–60)
LYMPHOCYTES # BLD AUTO: ABNORMAL 10*3/UL
LYMPHOCYTES # BLD MANUAL: 3.6 10E3/UL (ref 0.8–5.3)
LYMPHOCYTES NFR BLD AUTO: ABNORMAL %
LYMPHOCYTES NFR BLD MANUAL: 32 %
MAGNESIUM SERPL-MCNC: 2 MG/DL (ref 1.7–2.3)
MCH RBC QN AUTO: 33.3 PG (ref 26.5–33)
MCHC RBC AUTO-ENTMCNC: 32.4 G/DL (ref 31.5–36.5)
MCV RBC AUTO: 103 FL (ref 78–100)
MONOCYTES # BLD AUTO: ABNORMAL 10*3/UL
MONOCYTES # BLD MANUAL: 0.6 10E3/UL (ref 0–1.3)
MONOCYTES NFR BLD AUTO: ABNORMAL %
MONOCYTES NFR BLD MANUAL: 5 %
NEUTROPHILS # BLD AUTO: ABNORMAL 10*3/UL
NEUTROPHILS # BLD MANUAL: 7 10E3/UL (ref 1.6–8.3)
NEUTROPHILS NFR BLD AUTO: ABNORMAL %
NEUTROPHILS NFR BLD MANUAL: 61 %
NRBC # BLD AUTO: 0 10E3/UL
NRBC BLD AUTO-RTO: 0 /100
P AXIS - MUSE: 60 DEGREES
PLAT MORPH BLD: NORMAL
PLATELET # BLD AUTO: 251 10E3/UL (ref 150–450)
POTASSIUM SERPL-SCNC: 3.8 MMOL/L (ref 3.4–5.3)
PR INTERVAL - MUSE: 124 MS
PROT SERPL-MCNC: 6.8 G/DL (ref 6.4–8.3)
QRS DURATION - MUSE: 80 MS
QT - MUSE: 352 MS
QTC - MUSE: 478 MS
R AXIS - MUSE: 34 DEGREES
RBC # BLD AUTO: 3.12 10E6/UL (ref 4.4–5.9)
RBC MORPH BLD: NORMAL
SODIUM SERPL-SCNC: 140 MMOL/L (ref 135–145)
SPECIMEN EXPIRATION DATE: NORMAL
SYSTOLIC BLOOD PRESSURE - MUSE: NORMAL MMHG
T AXIS - MUSE: 67 DEGREES
TROPONIN T SERPL HS-MCNC: 15 NG/L
TROPONIN T SERPL HS-MCNC: 15 NG/L
VENTRICULAR RATE- MUSE: 111 BPM
WBC # BLD AUTO: 11.4 10E3/UL (ref 4–11)

## 2023-11-28 PROCEDURE — 83690 ASSAY OF LIPASE: CPT

## 2023-11-28 PROCEDURE — 85007 BL SMEAR W/DIFF WBC COUNT: CPT

## 2023-11-28 PROCEDURE — 74177 CT ABD & PELVIS W/CONTRAST: CPT

## 2023-11-28 PROCEDURE — 96375 TX/PRO/DX INJ NEW DRUG ADDON: CPT

## 2023-11-28 PROCEDURE — 96361 HYDRATE IV INFUSION ADD-ON: CPT

## 2023-11-28 PROCEDURE — 96374 THER/PROPH/DIAG INJ IV PUSH: CPT | Mod: 59

## 2023-11-28 PROCEDURE — 96376 TX/PRO/DX INJ SAME DRUG ADON: CPT

## 2023-11-28 PROCEDURE — C9113 INJ PANTOPRAZOLE SODIUM, VIA: HCPCS | Mod: JZ

## 2023-11-28 PROCEDURE — 80053 COMPREHEN METABOLIC PANEL: CPT

## 2023-11-28 PROCEDURE — 99222 1ST HOSP IP/OBS MODERATE 55: CPT | Performed by: INTERNAL MEDICINE

## 2023-11-28 PROCEDURE — 36415 COLL VENOUS BLD VENIPUNCTURE: CPT | Performed by: INTERNAL MEDICINE

## 2023-11-28 PROCEDURE — 258N000003 HC RX IP 258 OP 636

## 2023-11-28 PROCEDURE — 250N000013 HC RX MED GY IP 250 OP 250 PS 637: Performed by: INTERNAL MEDICINE

## 2023-11-28 PROCEDURE — 120N000001 HC R&B MED SURG/OB

## 2023-11-28 PROCEDURE — 85018 HEMOGLOBIN: CPT | Mod: 91 | Performed by: INTERNAL MEDICINE

## 2023-11-28 PROCEDURE — 86901 BLOOD TYPING SEROLOGIC RH(D): CPT

## 2023-11-28 PROCEDURE — 83735 ASSAY OF MAGNESIUM: CPT | Performed by: INTERNAL MEDICINE

## 2023-11-28 PROCEDURE — 84484 ASSAY OF TROPONIN QUANT: CPT

## 2023-11-28 PROCEDURE — 258N000003 HC RX IP 258 OP 636: Performed by: INTERNAL MEDICINE

## 2023-11-28 PROCEDURE — 36415 COLL VENOUS BLD VENIPUNCTURE: CPT

## 2023-11-28 PROCEDURE — C9113 INJ PANTOPRAZOLE SODIUM, VIA: HCPCS | Mod: JZ | Performed by: INTERNAL MEDICINE

## 2023-11-28 PROCEDURE — 250N000011 HC RX IP 250 OP 636

## 2023-11-28 PROCEDURE — 85027 COMPLETE CBC AUTOMATED: CPT

## 2023-11-28 PROCEDURE — 250N000009 HC RX 250

## 2023-11-28 PROCEDURE — 99285 EMERGENCY DEPT VISIT HI MDM: CPT | Mod: 25

## 2023-11-28 PROCEDURE — 250N000011 HC RX IP 250 OP 636: Mod: JZ | Performed by: INTERNAL MEDICINE

## 2023-11-28 PROCEDURE — 93005 ELECTROCARDIOGRAM TRACING: CPT

## 2023-11-28 PROCEDURE — G0378 HOSPITAL OBSERVATION PER HR: HCPCS

## 2023-11-28 PROCEDURE — 86850 RBC ANTIBODY SCREEN: CPT

## 2023-11-28 RX ORDER — ACETAMINOPHEN 325 MG/1
650 TABLET ORAL EVERY 4 HOURS PRN
Status: DISCONTINUED | OUTPATIENT
Start: 2023-11-28 | End: 2023-11-29 | Stop reason: HOSPADM

## 2023-11-28 RX ORDER — AMOXICILLIN 250 MG
1 CAPSULE ORAL 2 TIMES DAILY PRN
Status: DISCONTINUED | OUTPATIENT
Start: 2023-11-28 | End: 2023-11-29 | Stop reason: HOSPADM

## 2023-11-28 RX ORDER — ONDANSETRON 4 MG/1
4 TABLET, ORALLY DISINTEGRATING ORAL EVERY 6 HOURS PRN
Status: DISCONTINUED | OUTPATIENT
Start: 2023-11-28 | End: 2023-11-29 | Stop reason: HOSPADM

## 2023-11-28 RX ORDER — NAPROXEN SODIUM 220 MG
440 TABLET ORAL 2 TIMES DAILY PRN
Status: ON HOLD | COMMUNITY
End: 2023-11-29

## 2023-11-28 RX ORDER — UBIDECARENONE 100 MG
100 CAPSULE ORAL DAILY
COMMUNITY

## 2023-11-28 RX ORDER — SODIUM CHLORIDE 9 MG/ML
INJECTION, SOLUTION INTRAVENOUS CONTINUOUS
Status: DISCONTINUED | OUTPATIENT
Start: 2023-11-28 | End: 2023-11-29

## 2023-11-28 RX ORDER — KRILL/OM-3/DHA/EPA/PHOSPHO/AST 500MG-86MG
1000 CAPSULE ORAL DAILY
COMMUNITY

## 2023-11-28 RX ORDER — AMOXICILLIN 250 MG
2 CAPSULE ORAL 2 TIMES DAILY PRN
Status: DISCONTINUED | OUTPATIENT
Start: 2023-11-28 | End: 2023-11-29 | Stop reason: HOSPADM

## 2023-11-28 RX ORDER — ONDANSETRON 2 MG/ML
4 INJECTION INTRAMUSCULAR; INTRAVENOUS EVERY 6 HOURS PRN
Status: DISCONTINUED | OUTPATIENT
Start: 2023-11-28 | End: 2023-11-29 | Stop reason: HOSPADM

## 2023-11-28 RX ORDER — POLYETHYLENE GLYCOL 3350 17 G/17G
17 POWDER, FOR SOLUTION ORAL DAILY PRN
Status: DISCONTINUED | OUTPATIENT
Start: 2023-11-28 | End: 2023-11-29 | Stop reason: HOSPADM

## 2023-11-28 RX ORDER — LIDOCAINE 40 MG/G
CREAM TOPICAL
Status: DISCONTINUED | OUTPATIENT
Start: 2023-11-28 | End: 2023-11-29 | Stop reason: HOSPADM

## 2023-11-28 RX ORDER — ONDANSETRON 2 MG/ML
4 INJECTION INTRAMUSCULAR; INTRAVENOUS ONCE
Status: COMPLETED | OUTPATIENT
Start: 2023-11-28 | End: 2023-11-28

## 2023-11-28 RX ORDER — POLYETHYLENE GLYCOL 3350 17 G/17G
17 POWDER, FOR SOLUTION ORAL 2 TIMES DAILY PRN
Status: DISCONTINUED | OUTPATIENT
Start: 2023-11-28 | End: 2023-11-29 | Stop reason: HOSPADM

## 2023-11-28 RX ORDER — HYDROMORPHONE HYDROCHLORIDE 1 MG/ML
0.5 INJECTION, SOLUTION INTRAMUSCULAR; INTRAVENOUS; SUBCUTANEOUS
Status: DISCONTINUED | OUTPATIENT
Start: 2023-11-28 | End: 2023-11-28

## 2023-11-28 RX ORDER — POLYETHYLENE GLYCOL 3350 17 G/17G
17 POWDER, FOR SOLUTION ORAL DAILY
Status: DISCONTINUED | OUTPATIENT
Start: 2023-11-28 | End: 2023-11-28

## 2023-11-28 RX ORDER — PROCHLORPERAZINE 25 MG
12.5 SUPPOSITORY, RECTAL RECTAL EVERY 12 HOURS PRN
Status: DISCONTINUED | OUTPATIENT
Start: 2023-11-28 | End: 2023-11-29 | Stop reason: HOSPADM

## 2023-11-28 RX ORDER — IOPAMIDOL 755 MG/ML
500 INJECTION, SOLUTION INTRAVASCULAR ONCE
Status: COMPLETED | OUTPATIENT
Start: 2023-11-28 | End: 2023-11-28

## 2023-11-28 RX ORDER — ACETAMINOPHEN 650 MG/1
650 SUPPOSITORY RECTAL EVERY 4 HOURS PRN
Status: DISCONTINUED | OUTPATIENT
Start: 2023-11-28 | End: 2023-11-29 | Stop reason: HOSPADM

## 2023-11-28 RX ORDER — CALCIUM CARBONATE 500 MG/1
1000 TABLET, CHEWABLE ORAL 4 TIMES DAILY PRN
Status: DISCONTINUED | OUTPATIENT
Start: 2023-11-28 | End: 2023-11-29 | Stop reason: HOSPADM

## 2023-11-28 RX ORDER — PROCHLORPERAZINE MALEATE 5 MG
5 TABLET ORAL EVERY 6 HOURS PRN
Status: DISCONTINUED | OUTPATIENT
Start: 2023-11-28 | End: 2023-11-29 | Stop reason: HOSPADM

## 2023-11-28 RX ADMIN — HYDROMORPHONE HYDROCHLORIDE 0.5 MG: 1 INJECTION, SOLUTION INTRAMUSCULAR; INTRAVENOUS; SUBCUTANEOUS at 09:44

## 2023-11-28 RX ADMIN — SODIUM CHLORIDE: 9 INJECTION, SOLUTION INTRAVENOUS at 14:57

## 2023-11-28 RX ADMIN — PANTOPRAZOLE SODIUM 40 MG: 40 INJECTION, POWDER, FOR SOLUTION INTRAVENOUS at 09:39

## 2023-11-28 RX ADMIN — PANTOPRAZOLE SODIUM 40 MG: 40 INJECTION, POWDER, FOR SOLUTION INTRAVENOUS at 20:56

## 2023-11-28 RX ADMIN — SODIUM CHLORIDE 1000 ML: 9 INJECTION, SOLUTION INTRAVENOUS at 09:42

## 2023-11-28 RX ADMIN — IOPAMIDOL 100 ML: 755 INJECTION, SOLUTION INTRAVENOUS at 10:02

## 2023-11-28 RX ADMIN — ONDANSETRON 4 MG: 2 INJECTION INTRAMUSCULAR; INTRAVENOUS at 09:44

## 2023-11-28 RX ADMIN — SODIUM CHLORIDE 100 ML: 9 INJECTION, SOLUTION INTRAVENOUS at 10:02

## 2023-11-28 RX ADMIN — Medication 3 MG: at 22:26

## 2023-11-28 ASSESSMENT — ACTIVITIES OF DAILY LIVING (ADL)
CONCENTRATING,_REMEMBERING_OR_MAKING_DECISIONS_DIFFICULTY: NO
WEAR_GLASSES_OR_BLIND: NO
DIFFICULTY_COMMUNICATING: NO
ADLS_ACUITY_SCORE: 23
ADLS_ACUITY_SCORE: 20
FALL_HISTORY_WITHIN_LAST_SIX_MONTHS: NO
DRESSING/BATHING_DIFFICULTY: NO
TOILETING_ISSUES: NO
ADLS_ACUITY_SCORE: 23
CHANGE_IN_FUNCTIONAL_STATUS_SINCE_ONSET_OF_CURRENT_ILLNESS/INJURY: NO
ADLS_ACUITY_SCORE: 37
WALKING_OR_CLIMBING_STAIRS_DIFFICULTY: NO
HEARING_DIFFICULTY_OR_DEAF: NO
ADLS_ACUITY_SCORE: 35
ADLS_ACUITY_SCORE: 35
DOING_ERRANDS_INDEPENDENTLY_DIFFICULTY: NO
ADLS_ACUITY_SCORE: 37
DIFFICULTY_EATING/SWALLOWING: NO

## 2023-11-28 NOTE — CONSULTS
Gastroenterology Consultation     Consulting Physician   Bhumi Gipson DO     Reason For Consultation   GI bleeding     Chief Complaint   Isaiah Miller came to the hospital for evaluation of melena and coffee ground emesis.     History of Present Illness    Isaiah Miller is a pleasant 68 year old male who we are being asked to see regarding upper GI bleeding.    He has a history of Baltazar's esophagus and gastroesophageal reflux disease with previous fundoplication.    He began experiencing fatigue and generalized abdominal discomfort and reflux symptoms over the the last 4 to 6 weeks.  In the last 4 days he began to have black tarry stool.  Overnight he had 3 episodes of coffee-ground emesis.  He had dizziness and chest and abdominal pain.  He appeared pale and diaphoretic.  Because of this he sought medical care.  He recently had a wedding anniversary and had been drinking 3-4 drinks per day during that time.  He normally does not drink every day.  He does take baby ASA and he occasionally takes Aleve.  He is not on any PPI therapy.    -------  Colonoscopy February 5, 2020.  One 5 mm polyp removed.  This was a tubular adenoma.  Diverticulosis of the descending colon and sigmoid colon.  Normal colon mucosa.    EGD done February 5, 2020.  Long segment Baltazar's measuring 7 cm (from 32 cm to 39 cm).  Evidence of fundoplication found.  Stomach ulcer measuring 6 mm.  Duodenal polyps.  Biopsies showed normal duodenum including polyps.  Stomach biopsies showed reactive gastropathy with endoscopic ulceration.  Negative for H. pylori.  Esophagus biopsies showed specialized Baltazar's epithelium negative for dysplasia.     Past History   Past Medical History:   Diagnosis Date    CAD (coronary artery disease)     HTN (hypertension)     Hyperlipemia       5 coronary stents     Family History Social History   No family history on file.  Occupation: [employer]   Marital Status:  2 children 1 grandchild    "Tobacco: None   Alcohol: Yes   Recreational Drugs: None     Medications    Medications Prior to Admission   Medication Sig Dispense Refill Last Dose    aspirin 81 MG EC tablet Take 81 mg by mouth daily   11/27/2023 at am    co-enzyme Q-10 100 MG CAPS capsule Take 100 mg by mouth daily   11/27/2023 at am    Krill Oil 500 MG CAPS Take 1,000 mg by mouth daily   11/27/2023 at am    metoprolol succinate ER (TOPROL-XL) 50 MG 24 hr tablet Take 1 tablet (50 mg) by mouth daily 90 tablet 3 11/26/2023 at pm    multivitamin, therapeutic with minerals (MULTI-VITAMIN) TABS tablet Take 1 tablet by mouth daily   11/27/2023 at am    naphazoline-pheniramine (NAPHCON A) SOLN ophthalmic solution Place 1-2 drops into both eyes 4 times daily as needed for irritation   Past Week at ?    naproxen sodium (ANAPROX) 220 MG tablet Take 440 mg by mouth 2 times daily as needed for moderate pain   Unknown at ?    simvastatin (ZOCOR) 40 MG tablet Take 1 tablet (40 mg) by mouth At Bedtime 90 tablet 1 11/26/2023 at pm        Allergies   Allergies   Allergen Reactions    Bactrim [Sulfamethoxazole-Trimethoprim] Rash    Tamiflu [Oseltamivir] Rash         Review of Systems   A comprehensive review of systems was performed and was otherwise noncontributory.     Objective     Vitals Blood pressure 134/72, pulse 100, temperature 98.8  F (37.1  C), temperature source Oral, resp. rate 16, height 1.778 m (5' 10\"), weight 102.9 kg (226 lb 13.7 oz), SpO2 97%.          Physical   Exam  GENERAL: alert and oriented, well nourished in no apparent distress   SKIN: warm and dry, no rashes   HEENT: atraumatic, anicteric, moist mucous membranes, neck soft/supple    PULMONARY: normal resp effort, breath sounds clear to auscultation bilateral   CARDIOVASCULAR: normal rate and rhythm, no murmurs, no edema   ABDOMEN: epigastric tenderness, no distention, bowel sounds normal   NEUROLOGICAL: appropriate mental status, grossly intact   PSYCHIATRIC: normal mood, affect and " insight        Laboratory     Electrolytes    Recent Labs   Lab 11/28/23  0926      POTASSIUM 3.8   CHLORIDE 103   CO2 22   *   CR 0.80   BUN 30.8*      Hematology    Recent Labs   Lab 11/28/23  1431 11/28/23  0926   HGB 8.9* 10.4*   MCV  --  103*   WBC  --  11.4*   PLT  --  251      LFTs & Lipase    Recent Labs   Lab 11/28/23  0926   AST 23   ALT 34   ALKPHOS 50   BILITOTAL 0.4   LIPASE 23       Imaging Studies     CT CHEST/ABDOMEN/PELVIS W CONTRAST 11/28/2023 10:12 AM     CLINICAL HISTORY: black emesis and stools, chest pain and abdominal  pain, history of barretts esophagus     TECHNIQUE: CT scan of the chest, abdomen, and pelvis was performed  following injection of IV contrast. Multiplanar reformats were  obtained. Dose reduction techniques were used.   CONTRAST: 100mL Isovue-370     COMPARISON: Esophagram 11/20/2003.     FINDINGS:   LUNGS AND PLEURA: No consolidation. Minimal linear atelectasis in the  right middle lobe. No suspicious pulmonary nodules or masses. No  pleural effusions or pneumothorax.     MEDIASTINUM/AXILLAE: Visualized thyroid gland is unremarkable. No  thoracic adenopathy. No cardiomegaly. No pericardial effusion. Normal  caliber thoracic aorta. Aortic atherosclerosis. Moderate coronary  artery calcifications with possible previous intervention. Although  not a dedicated PE study, no gross PE.     HEPATOBILIARY: Diffuse low-attenuation in the liver consistent with  steatosis.     PANCREAS: Normal.     SPLEEN: Normal.     ADRENAL GLANDS: Normal.     KIDNEYS/BLADDER: There are a couple of nonobstructive bilateral renal  calculi, measuring up to 4 mm in size. No hydronephrosis. Urinary  bladder unremarkable.     BOWEL: Postoperative changes at the gastroesophageal junction. No  small bowel obstruction. Descending and sigmoid diverticulosis.     PELVIC ORGANS: Prostate unremarkable. Vas deferens calcifications.     ADDITIONAL FINDINGS: No abdominal pelvic lymphadenopathy. No  ascites.  No free air. Aortic atherosclerosis. No abdominal aortic aneurysm.  There are superficial varicosities in the upper thighs.     MUSCULOSKELETAL: Normal.                                                                 IMPRESSION:  1.  No acute findings in the visualized chest, abdomen, or pelvis.    I have reviewed the current diagnostic and laboratory tests.           Impression and Plan    Upper GI bleeding.  Possibilities include esophagitis, esophageal ulcer, gastric ulcer etc.  He does have a history of Baltazar's esophagus and has a history of gastric ulcer.  He has been on NSAIDs and has not been on PPI therefore ulcer seems very likely.  He is on PPI therapy in the hospital.  He is currently n.p.o.  Would recommend a serial hemoglobin.  Transfuse for hemoglobin less than 7.  Will plan on EGD tomorrow unless more urgently needed this evening.        45 minutes of total time was spent providing patient care including patient evaluation, reviewing documentation/test results, and .           Alcides Boateng MD  Thank you for the opportunity to participate in the care of this patient.   Please feel free to call me with any questions or concerns.  Phone number [phone].

## 2023-11-28 NOTE — ED NOTES
[facility]  ED Nurse Handoff Report    ED Chief complaint: Shortness of Breath and Dizziness  . ED Diagnosis:   Final diagnoses:   Anemia, unspecified type   Upper GI bleed       Allergies:   Allergies   Allergen Reactions    Tamiflu [Oseltamivir] Rash       Code Status: Full Code    Activity level - Baseline/Home:  independent.  Activity Level - Current:   standby.   Lift room needed: No.   Bariatric: No   Needed: No   Isolation: No.   Infection: Not Applicable.     Respiratory status: Room air    Vital Signs (within 30 minutes):   Vitals:    11/28/23 1045 11/28/23 1100 11/28/23 1115 11/28/23 1130   BP: 103/72 (!) 125/91 120/66 121/80   Pulse: 92 103 93 103   Resp:       Temp:       TempSrc:       SpO2: 95% 95%     Weight:           Cardiac Rhythm:  ,      Pain level:    Patient confused: No.   Patient Falls Risk: nonskid shoes/slippers when out of bed.   Elimination Status: Has voided     Patient Report - Initial Complaint: SOB dark stools.   Focused Assessment: Isaiah Miller is a 68 year old male with history of Barrette's esophagus, hypertension, hyperlipidemia, and CAD who presents to the ED with his wife for shortness of breath and bloody stools. The patient's wife reports the patient has had dark bloody stools over the last four days and last night had three episodes of black vomit. The patient has developed central chest and abdominal pain today, rated 6/10, and reports feeling shortness of breath and dizzy. He has had worsening heart burn over the last week. The patient denies heavy drinking normally but but states he was on vacation last week and consumed four alcoholic drinks a day. He is over due for endoscopy. The patient denies drug or NSAID use. He has past abdominal surgeical history appendectomy.       Abnormal Results:   Labs Ordered and Resulted from Time of ED Arrival to Time of ED Departure   COMPREHENSIVE METABOLIC PANEL - Abnormal       Result Value     Sodium 140      Potassium 3.8      Carbon Dioxide (CO2) 22      Anion Gap 15      Urea Nitrogen 30.8 (*)     Creatinine 0.80      GFR Estimate >90      Calcium 8.8      Chloride 103      Glucose 149 (*)     Alkaline Phosphatase 50      AST 23      ALT 34      Protein Total 6.8      Albumin 4.2      Bilirubin Total 0.4     CBC WITH PLATELETS AND DIFFERENTIAL - Abnormal    WBC Count 11.4 (*)     RBC Count 3.12 (*)     Hemoglobin 10.4 (*)     Hematocrit 32.1 (*)      (*)     MCH 33.3 (*)     MCHC 32.4      RDW 14.4      Platelet Count 251      % Neutrophils        % Lymphocytes        % Monocytes        % Eosinophils        % Basophils        % Immature Granulocytes        NRBCs per 100 WBC 0      Absolute Neutrophils        Absolute Lymphocytes        Absolute Monocytes        Absolute Eosinophils        Absolute Basophils        Absolute Immature Granulocytes        Absolute NRBCs 0.0     TROPONIN T, HIGH SENSITIVITY - Normal    Troponin T, High Sensitivity 15     LIPASE - Normal    Lipase 23     DIFFERENTIAL    % Neutrophils 61      % Lymphocytes 32      % Monocytes 5      % Eosinophils 1      % Basophils 1      Absolute Neutrophils 7.0      Absolute Lymphocytes 3.6      Absolute Monocytes 0.6      Absolute Eosinophils 0.1      Absolute Basophils 0.1      RBC Morphology Confirmed RBC Indices      Platelet Assessment        Value: Automated Count Confirmed. Platelet morphology is normal.   TROPONIN T, HIGH SENSITIVITY   TYPE AND SCREEN, ADULT    ABO/RH(D) A NEG      Antibody Screen Negative      SPECIMEN EXPIRATION DATE [id number]     ABO/RH TYPE AND SCREEN        CT Chest/Abdomen/Pelvis w Contrast   Final Result   IMPRESSION:   1.  No acute findings in the visualized chest, abdomen, or pelvis.      OWEN SKINNER MD            SYSTEM ID:  [id number]          Treatments provided: Labs, fluids and scans  Family Comments: Wife at bedside  OBS brochure/video discussed/provided to patient:  Yes  ED  Medications:   Medications   HYDROmorphone (PF) (DILAUDID) injection 0.5 mg (0.5 mg Intravenous $Given 11/28/23 0944)   sodium chloride 0.9 % bag 100 mL for CT scan flush use (100 mLs As instructed $Given 11/28/23 1002)   sodium chloride 0.9% BOLUS 1,000 mL (1,000 mLs Intravenous $New Bag 11/28/23 0942)   ondansetron (ZOFRAN) injection 4 mg (4 mg Intravenous $Given 11/28/23 0944)   pantoprazole (PROTONIX) IV push injection 40 mg (40 mg Intravenous $Given 11/28/23 0939)   iopamidol (ISOVUE-370) solution 500 mL (100 mLs Intravenous $Given 11/28/23 1002)       Drips infusing:  No  For the majority of the shift this patient was Green.   Interventions performed were See MAR.    Sepsis treatment initiated: No    Cares/treatment/interventions/medications to be completed following ED care: N/A    ED Nurse Name: Rosy Covington RN  11:57 AM   RECEIVING UNIT ED HANDOFF REVIEW    Above ED Nurse Handoff Report was reviewed: Yes  Reviewed by: Laureen Flaherty RN on November 28, 2023 at 2:01 PM

## 2023-11-28 NOTE — ED TRIAGE NOTES
Over the last 4 days Pt has had multiple black stools. Since last night Pt has had black vomitus 3 separate times. Pt is complaining of chest pain, shortness of breath, and lightheaded.

## 2023-11-28 NOTE — PLAN OF CARE
To Do:  End of Shift Summary  For vital signs and complete assessments, please see documentation flowsheets.     Pertinent assessments: A&Ox 4, VSS on room air. Afebrile. Denies nausea, Chest pain and SOB. Up SBA in the room. IVF @ 100 ml/hr. Q4 vitals and hemoglobin check. K; MG protocol. NPO, 1 occurrence of black tarry stool.     Events: admitted to unit.     Treatment Plan: GI consult, Monitor vitals and hemoglobin;     Bedside Nurse: Laureen Flaherty RN

## 2023-11-28 NOTE — ED PROVIDER NOTES
ED ATTENDING PHYSICIAN NOTE:   I evaluated this patient in conjunction with Sanjuana Steele PA-C  I have participated in the care of the patient and personally performed key elements of the history, exam, and medical decision making.      HPI:   Isaiah Miller is a 68 year old male with history of Baltazar's esophagus, hypertension hyperlipidemia, CAD who presents to the ER with abdominal pain, vomiting, black stools.  He states that last night he has had 3 episodes of coffee-ground emesis.  States that he is also had black color stool.  He denies any daily NSAID use.  No prior history of GI bleeding.  States he has never required a blood transfusion in the past.  He states that he has had generalized fatigue.  Reports he has generalized abdominal pain.  Reports no chest pain or neck pain.    Independent Historian:   None - Patient Only    Review of External Notes: Reviewed nurse triage call from earlier today     EXAM:   General:  Alert, interactive  Cardiovascular:  Well perfused  Lungs:  No respiratory distress, no accessory muscle use  Palpable crepitance to the neck or supraclavicular region.  Abdomen is soft without any rebound or guarding.  Neuro:  Moving all 4 extremities  Skin:  Warm, dry  Psych:  Normal affect         MEDICAL DECISION MAKING/ASSESSMENT AND PLAN:   68-year-old male presenting today with abdominal pain, coffee-ground emesis, black stools.  No prior history of GI bleeding.  Hemoglobin found to be low from baseline at 10.  His CT chest abdomen pelvis showed no evidence of any free air.  Given concern for suspected GI bleed patient to be admitted to hospitalist service also will be evaluated for his chest pain.     DIAGNOSIS:     ICD-10-CM    1. Upper GI bleed  K92.2       2. Anemia, unspecified type  D64.9       3. Chest pain, unspecified type  R07.9     Moderate risk heart score               DISPOSITION:   Admit     Bhumi Gipson DO  11/28/2023  [facility] EMERGENCY DEPT        Bhumi Gipson DO  11/28/23 1239

## 2023-11-28 NOTE — ED NOTES
"[facility]    ED Boarding Nurse Handoff Addendum Report:    Date/time: 11/28/2023, 1:51 PM    Activity Level: standby and assist of 1    Fall Risk: Yes:  nonskid shoes/slippers when out of bed, arm band in place, patient and family education, and activity supervised    Active Infusions: none    Current Meds Due: see MAR    Current care needs: per plan of care    Oxygen requirements (liters/min and/or FiO2): none    Respiratory status: Room air    Vital signs (within last 30 minutes):    Vitals:    11/28/23 1200 11/28/23 1215 11/28/23 1300 11/28/23 1308   BP: 124/78 126/89  108/54   BP Location:    Right arm   Pulse: 89 90     Resp:       Temp:    98.4  F (36.9  C)   TempSrc:    Oral   SpO2:    96%   Weight:       Height:   1.778 m (5' 10\")        Focused assessment within last 30 minutes:    Patient is alert and oriented x4. Denies of any pain. Had an episode of black stool states he feels better after. C/o of dizziness upon trying to stand and ambulate. Bedside commode provided. Wife at bedside.     ED Boarding Nurse name: Angela Shelton RN   ED boarder care from 1308-  "

## 2023-11-28 NOTE — H&P
Physician Attestation   I, Antonio Huang DO, was present with the medical/MARIA GUADALUPE student who participated in the service and in the documentation of the note.  I have verified the history and personally performed the physical exam and medical decision making.  I agree with the assessment and plan of care as documented in the note.      Key findings: Appreciate GI recommendations.  IV PPI BID.  NPO.  Hgb q8h.  Transfuse for hgb less than 7.    Please see A&P for additional details of medical decision making.    I have personally reviewed the following data over the past 24 hrs:    11.4 (H)  \   8.9 (L)   / 251     140 103 30.8 (H) /  149 (H)   3.8 22 0.80 \     ALT: 34 AST: 23 AP: 50 TBILI: 0.4   ALB: 4.2 TOT PROTEIN: 6.8 LIPASE: 23     Trop: 15 BNP: N/A         Antonio Huang DO  Date of Service (when I saw the patient): 11/28/23    [facility]    History and Physical - Hospitalist Service       Date of Admission:  11/28/2023    Assessment & Plan      Isaiah Miller is a 68 year old male admitted on 11/28/2023. He has a past medical history of CVD s/p stent placement in 2007, Baltazar's esophagus, GERD with gastric fundoplication, hypertension, IVONE, and hyperlipidemia and is admitted for hematemesis and melena. He began to notice black, tarry stools 4 days ago and experienced coffee-ground emesis 3x the evening of 11/27. He also notes recent-onset abdominal pain, dizziness and chest pain and his wife reports that he appeared pale and diaphoretic. Patient reports increased heartburn, upset stomach, and diarrhea for the past 4-6 weeks and fatigue for the last 2 weeks. He does not drink alcohol every day but does report recent alcohol use (3-4 drinks/day) in the last week. No NSAID use. No prior history of GI bleed. In the ED, BP was 151/96, Temp 97.8F, Pulse 127, Resp 26, O2 100% on room air. CMP was grossly normal with the exception of elevated BUN at 30.8 and mildly elevated glucose at  149. CBC indicated WBC at 11.4, Hgb at 10.4, Platelets at 251. EKG showed sinus tachycardia with no other abnormalities. CT Chest/Abd/Pelvis without acute findings. He was given Protonix 40mg IV, NS 1L bolus, Zofran 4mg IV. Subsequent vitals signs indicated pulse 90, /54. Type and screen ordered.     Problems:    Acute Intractable Abdominal pain  Melena  Hematemesis  Most likely upper GI bleed due to ulcer, less likely gastritis or gastric or esophageal varices, unlikely Diandra-Car tear. Given insidious onset of abdominal discomfort and fatigue, progressive melena and hematemesis, recent alcohol use, most likely bleeding due to duodenal or gastric ulcer. Less likely gastritis given lack of fever and less likely varices given no known history of varices or liver disease. Unlikely Diandra Car tear given presence of melena and lack of bright red blood on hematemesis.  - GI consult ordered  - Continue Protonix IV 40mg BID  - Start IVF  - NPO for likely endoscopy   - Zofran PRN  - Check Hgb q8 hours  - Tylenol 650mg PRN  - Daily BMP  - Advise etoh cessation    Dizziness  Fatigue  Acute Blood Loss Anemia  Most likely related to upper GI bleed.  - Start IVF to maintain volume status  - Type and screen ordered, consent signed for possible PRBC transfusion  - Check Hgb q8 hours  - Transfuse for hgb less than 7  - Daily CBC    Hypertension  - Hold metoprolol in the setting of GIB.  Anticipate restarting tomorrow    Chronic Medical Problems:  Baltazar's Esophagus     Diet: NPO per Anesthesia Guidelines for Procedure/Surgery Except for: Meds    DVT Prophylaxis: Pneumatic Compression Devices  Le Catheter: Not present  Fluids: NS, continuous at 100mL/hr  Lines: None     Cardiac Monitoring: None  Code Status: Full Code      Clinically Significant Risk Factors Present on Admission                # Drug Induced Platelet Defect: home medication list includes an antiplatelet medication   # Hypertension: Noted on problem  "list      # Obesity: Estimated body mass index is 32.55 kg/m  as calculated from the following:    Height as of this encounter: 1.778 m (5' 10\").    Weight as of this encounter: 102.9 kg (226 lb 13.7 oz).              Disposition Plan      Expected Discharge Date: 11/30/2023                The patient's care was discussed with the Patient.      Lashon Trejo, MS3  [employer]  [facility]  Securely message with [employer] (more info)  Text page via [facility] Paging/Directory   ______________________________________________________________________    Chief Complaint   Black, tarry stools and coffee-ground emesis.    History is obtained from the patient and spouse.    History of Present Illness   Isaiah Miller is a 68 year old male who has a history of CVD s/p stent placement in 2007, Baltazar's esophagus, GERD with gastric fundoplication, hypertension, IVONE, and hyperlipidemia and is admitted for likely upper GI bleed. Patient began experiencing fatigue, generalized abdominal discomfort, episodes of diarrhea in the last 4-6 weeks. In the last 4 days, he began to have black, tarry stools. Overnight, he had 3 episodes of vomiting coffee-ground like material. He simultaneously began to feel dizzy with increased chest/abdominal pain. His wife reports he appeared very pale and diaphoretic. They called his primary care clinic this morning and they recommended he seek emergency care. He notes he hasn't had many GI issues since his fundoplication procedure, which resolved his GERD. He and his wife recently celebrated their 40th wedding anniversary last week with a \"staycation\", and he had 3-4 drinks/day during this time. He normally does not drink every day. Does not use tobacco. No NSAID use. Occasionally takes Aleve. Since arriving in the ED, he has had at least 1 BM which he reports is painful and continues to have abdominal discomfort.      Past Medical " History    Past Medical History:   Diagnosis Date    CAD (coronary artery disease)     HTN (hypertension)     Hyperlipemia        Past Surgical History   No past surgical history on file.    Prior to Admission Medications   Prior to Admission Medications   Prescriptions Last Dose Informant Patient Reported? Taking?   Krill Oil 500 MG CAPS 11/27/2023 at am  Yes Yes   Sig: Take 1,000 mg by mouth daily   aspirin 81 MG EC tablet 11/27/2023 at am  Yes Yes   Sig: Take 81 mg by mouth daily   co-enzyme Q-10 100 MG CAPS capsule 11/27/2023 at am  Yes Yes   Sig: Take 100 mg by mouth daily   metoprolol succinate ER (TOPROL-XL) 50 MG 24 hr tablet 11/26/2023 at pm  No Yes   Sig: Take 1 tablet (50 mg) by mouth daily   multivitamin, therapeutic with minerals (MULTI-VITAMIN) TABS tablet 11/27/2023 at am  Yes Yes   Sig: Take 1 tablet by mouth daily   naphazoline-pheniramine (NAPHCON A) SOLN ophthalmic solution Past Week at ?  Yes Yes   Sig: Place 1-2 drops into both eyes 4 times daily as needed for irritation   naproxen sodium (ANAPROX) 220 MG tablet Unknown at ?  Yes Yes   Sig: Take 440 mg by mouth 2 times daily as needed for moderate pain   simvastatin (ZOCOR) 40 MG tablet 11/26/2023 at pm  No Yes   Sig: Take 1 tablet (40 mg) by mouth At Bedtime      Facility-Administered Medications: None        Physical Exam   Vital Signs: Temp: 98.4  F (36.9  C) Temp src: Oral BP: 108/54 Pulse: 90   Resp: 26 SpO2: 96 % O2 Device: None (Room air)    Weight: 226 lbs 13.65 oz    Constitutional: awake, alert, cooperative, and appears stated age, mild distress, and pale  Respiratory: No increased work of breathing, good air exchange, clear to auscultation bilaterally, no crackles or wheezing  Skin: no bruising or bleeding, normal skin color, texture, turgor, and no redness, warmth, or swelling; healing wound noted on LE  Neuropsychiatric: General: normal and normal eye contact  Level of consciousness: alert / normal  Affect: normal  Orientation:  oriented to self, place, time and situation    Medical Decision Making     Please see A&P for additional details of medical decision making.      Data     I have personally reviewed the following data over the past 24 hrs:    11.4 (H)  \   8.9 (L)   / 251     140 103 30.8 (H) /  149 (H)   3.8 22 0.80 \     ALT: 34 AST: 23 AP: 50 TBILI: 0.4   ALB: 4.2 TOT PROTEIN: 6.8 LIPASE: 23     Trop: 15 BNP: N/A       Imaging results reviewed over the past 24 hrs:   Recent Results (from the past 24 hour(s))   CT Chest/Abdomen/Pelvis w Contrast    Narrative    CT CHEST/ABDOMEN/PELVIS W CONTRAST 11/28/2023 10:12 AM    CLINICAL HISTORY: black emesis and stools, chest pain and abdominal  pain, history of barretts esophagus    TECHNIQUE: CT scan of the chest, abdomen, and pelvis was performed  following injection of IV contrast. Multiplanar reformats were  obtained. Dose reduction techniques were used.   CONTRAST: 100mL Isovue-370    COMPARISON: Esophagram 11/20/2003.    FINDINGS:   LUNGS AND PLEURA: No consolidation. Minimal linear atelectasis in the  right middle lobe. No suspicious pulmonary nodules or masses. No  pleural effusions or pneumothorax.    MEDIASTINUM/AXILLAE: Visualized thyroid gland is unremarkable. No  thoracic adenopathy. No cardiomegaly. No pericardial effusion. Normal  caliber thoracic aorta. Aortic atherosclerosis. Moderate coronary  artery calcifications with possible previous intervention. Although  not a dedicated PE study, no gross PE.    HEPATOBILIARY: Diffuse low-attenuation in the liver consistent with  steatosis.    PANCREAS: Normal.    SPLEEN: Normal.    ADRENAL GLANDS: Normal.    KIDNEYS/BLADDER: There are a couple of nonobstructive bilateral renal  calculi, measuring up to 4 mm in size. No hydronephrosis. Urinary  bladder unremarkable.    BOWEL: Postoperative changes at the gastroesophageal junction. No  small bowel obstruction. Descending and sigmoid diverticulosis.    PELVIC ORGANS: Prostate  unremarkable. Vas deferens calcifications.    ADDITIONAL FINDINGS: No abdominal pelvic lymphadenopathy. No ascites.  No free air. Aortic atherosclerosis. No abdominal aortic aneurysm.  There are superficial varicosities in the upper thighs.    MUSCULOSKELETAL: Normal.      Impression    IMPRESSION:  1.  No acute findings in the visualized chest, abdomen, or pelvis.    OWEN SKINNER MD         SYSTEM ID:  [id number]

## 2023-11-28 NOTE — ED PROVIDER NOTES
History     Chief Complaint:  Shortness of Breath and Dizziness     HPI   Isaiah Miller is a 68 year old male with history of Barrette's esophagus, hypertension, hyperlipidemia, and CAD who presents to the ED with his wife for shortness of breath and bloody stools. The patient's wife reports the patient has had dark bloody stools over the last four days and last night had three episodes of black vomit. The patient has developed central chest and abdominal pain today, rated 6/10, and reports feeling shortness of breath and dizzy. He has had worsening heart burn over the last week. The patient denies heavy drinking normally but but states he was on vacation last week and consumed four alcoholic drinks a day. He is over due for endoscopy. The patient denies drug or NSAID use. He has past abdominal surgeical history appendectomy.     Independent Historian:   None - Patient Only    Review of External Notes:   The patient called family medicine today concern for blood in stool, black and stringy, looks like coffee ground.   Hemoglobin 6/12/23 at [facility] noted to be 14.4.    Medications:    Aspirin 81 mg  Toprol  CoQ-10  Claritin  Zetia  Zocor    Past Medical History:    Baltazar's Esophagus  CAD  Coronary atherosclerosis  CVD  Esophageal reflux  Hypertension  Hyperlipidemia  Lumbago  Prediabetes  Vertigo  Sciatica  IVONE    Past Surgical History:    Appendectomy  Carpal Tunnel Release  Vein stripping bilateral  Gastric Fundoplication  Coronary stent placement  Tonsillectomy     Physical Exam   Patient Vitals for the past 24 hrs:   BP Temp Temp src Pulse Resp SpO2 Weight   11/28/23 1215 126/89 -- -- 90 -- -- --   11/28/23 1200 124/78 -- -- 89 -- -- --   11/28/23 1145 132/85 -- -- 94 -- -- --   11/28/23 1130 121/80 -- -- 103 -- -- --   11/28/23 1115 120/66 -- -- 93 -- -- --   11/28/23 1100 (!) 125/91 -- -- 103 -- 95 % --   11/28/23 1045 103/72 -- -- 92 -- 95 % --   11/28/23 1030 116/71 -- -- 96 -- 96 % --   11/28/23 0936  -- -- -- 109 -- 98 % --   11/28/23 0921 (!) 139/97 -- -- -- -- -- --   11/28/23 0910 (!) 151/96 97.8  F (36.6  C) Oral (!) 127 26 100 % 102.9 kg (226 lb 13.7 oz)        Physical Exam  General: Uncomfortable appearing adult male. Elevated BMI.  HENT:   Head: Atraumatic.  Mouth/Throat: Oropharynx clear and moist.  Eyes: Conjunctive and EOM normal. PERRLA.  Neck: Normal ROM. No rigidity.  CV: Tachycardic rate, regular rhythm. Normal S1, S2. No appreciable murmurs.  Resp: Lungs clear to auscultation bilaterally. Normal respiratory effort. No cough observed.  GI: Bowel sounds normal. Abdomen soft, non distended. Generalized ttp without rebound or guarding. No pulsatile mass.  MSK: Normal range of motion.No reproducible chest wall tenderness or crepitus.  Skin: Warm and dry. No rash.  Neuro: Awake, alert, oriented x 3.  Psych: Normal mood and affect.    Emergency Department Course   ECG  ECG taken at 0921, ECG read at 0926  No STEMI  Sinus tachycardia  Otherwise normal ECG   No change as compared to prior, dated 1/23/07.  Rate 111 bpm. MT interval 124 ms. QRS duration 80 ms. QT/QTc 352/478 ms. P-R-T axes 60/34/67.     Imaging:  CT Chest/Abdomen/Pelvis w Contrast   Final Result   IMPRESSION:   1.  No acute findings in the visualized chest, abdomen, or pelvis.      OWEN SKINNER MD            SYSTEM ID:  [id number]         Laboratory:  Labs Ordered and Resulted from Time of ED Arrival to Time of ED Departure   COMPREHENSIVE METABOLIC PANEL - Abnormal       Result Value    Sodium 140      Potassium 3.8      Carbon Dioxide (CO2) 22      Anion Gap 15      Urea Nitrogen 30.8 (*)     Creatinine 0.80      GFR Estimate >90      Calcium 8.8      Chloride 103      Glucose 149 (*)     Alkaline Phosphatase 50      AST 23      ALT 34      Protein Total 6.8      Albumin 4.2      Bilirubin Total 0.4     CBC WITH PLATELETS AND DIFFERENTIAL - Abnormal    WBC Count 11.4 (*)     RBC Count 3.12 (*)     Hemoglobin 10.4 (*)     Hematocrit  32.1 (*)      (*)     MCH 33.3 (*)     MCHC 32.4      RDW 14.4      Platelet Count 251      % Neutrophils        % Lymphocytes        % Monocytes        % Eosinophils        % Basophils        % Immature Granulocytes        NRBCs per 100 WBC 0      Absolute Neutrophils        Absolute Lymphocytes        Absolute Monocytes        Absolute Eosinophils        Absolute Basophils        Absolute Immature Granulocytes        Absolute NRBCs 0.0     TROPONIN T, HIGH SENSITIVITY - Normal    Troponin T, High Sensitivity 15     LIPASE - Normal    Lipase 23     TROPONIN T, HIGH SENSITIVITY - Normal    Troponin T, High Sensitivity 15     DIFFERENTIAL    % Neutrophils 61      % Lymphocytes 32      % Monocytes 5      % Eosinophils 1      % Basophils 1      Absolute Neutrophils 7.0      Absolute Lymphocytes 3.6      Absolute Monocytes 0.6      Absolute Eosinophils 0.1      Absolute Basophils 0.1      RBC Morphology Confirmed RBC Indices      Platelet Assessment        Value: Automated Count Confirmed. Platelet morphology is normal.   TYPE AND SCREEN, ADULT    ABO/RH(D) A NEG      Antibody Screen Negative      SPECIMEN EXPIRATION DATE [id number]     ABO/RH TYPE AND SCREEN      Emergency Department Course & Assessments:     Interventions:  Medications   HYDROmorphone (PF) (DILAUDID) injection 0.5 mg (0.5 mg Intravenous $Given 11/28/23 0944)   sodium chloride 0.9 % bag 100 mL for CT scan flush use (100 mLs As instructed $Given 11/28/23 1002)   sodium chloride 0.9% BOLUS 1,000 mL (1,000 mLs Intravenous $New Bag 11/28/23 0942)   ondansetron (ZOFRAN) injection 4 mg (4 mg Intravenous $Given 11/28/23 0944)   pantoprazole (PROTONIX) IV push injection 40 mg (40 mg Intravenous $Given 11/28/23 0939)   iopamidol (ISOVUE-370) solution 500 mL (100 mLs Intravenous $Given 11/28/23 1002)      Assessments:  0919 Initial Examination  1057 Rechecked patient and discussed results. Patient rest comfortably on cart, feeling  improved.  1137 Rechecked patient and discussed plan of care.    Independent Interpretation (X-rays, CTs, rhythm strip):  None    Consultations/Discussion of Management or Tests:  0925 I staffed and discussed the patient with Dr. Gipson, recommending CT chest abdomen with contrast.  1221 I discussed the patient with Dr. Huang, Hospitalist.       Social Determinants of Health affecting care:   None    Disposition:  The patient was admitted to the hospital under the care of Dr. Huang.     Impression & Plan        Medical Decision Making:  Isaiah Miller is a 68 year old male history of Baltazar's esophagus, hypertension, hyperlipidemia and CAD who came into the emergency department today complaining of chest pain, abdominal pain, 4 days of black and tarry stools and coffee-ground emesis that started last night.  Ultimately patient is being admitted for suspected upper GI bleed, anemia and chest pain with moderate risk heart score.    On arrival he is tachycardic at 127.  Fluids, Zofran, Protonix initiated as well as pain medications.  Vital signs normalized after these interventions.  EKG with no ischemic changes.  Serial troponins are flat and within normal limits ruling him out for acute MI.  Did not suspect PE since he had no risk factors as captured in the HPI above.  CT chest abdomen pelvis with contrast showed no acute abnormalities and no free air in the chest.  Patient's hemoglobin at 10.4 which represents a drop from his baseline.  Type and screen ordered.  I am concerned for upper GI bleed and admitted the patient to the hospitalist service under the care of Dr. Huang.  With his history of Baltazar's esophagus and recent coffee-ground emesis and black and tarry stools, he will need urgent endoscopy.  Regarding his chest discomfort, he has history of hypertension, hyperlipidemia and CAD.  He has a moderate risk heart score and will need to be placed on telemetry as an inpatient as well.  This patient came in as  a level 2 and required hospital admission, therefore I staffed the patient with Dr. Gipson.  Please see separate APC supervisory note.  Patient remained hemodynamically stable during his course in the ED prior to admission.    Heart Score:  History: Moderately suspicious  EKG: No acute ischemic changes  Age: 68  Risk factors: Obesity, hypertension, hyperlipidemia, CAD  Initial troponin: Within normal limits  Moderate risk    Diagnosis:    ICD-10-CM    1. Upper GI bleed  K92.2       2. Anemia, unspecified type  D64.9       3. Chest pain, unspecified type  R07.9     Moderate risk heart score          Scribe Disclosure:  I, Murray Myers, am serving as a scribe at 9:26 AM on 11/28/2023 to document services personally performed by Sanjuana Steele PA-C based on my observations and the provider's statements to me.     11/28/2023   Sanjuana Steele PA-C Dewing, Jennifer C, PA-C  11/28/23 1230

## 2023-11-28 NOTE — PHARMACY-ADMISSION MEDICATION HISTORY
Pharmacist Admission Medication History    Admission medication history is complete. The information provided in this note is only as accurate as the sources available at the time of the update.    Information Source(s): Patient, Family member, Hospital records, and CareEverywhere/SureScripts via in-person    Pertinent Information: pt was on zetia 10mg daily.  Last filled 10/17/23 for 14 day supply.  Pt states its gone and he is no longer taking-there were no refills on the med.  Unclear if pt should still be taking or now.  Pt has not had for approx 1 month    Changes made to PTA medication list:  Added: opcon A, coq10, krill oil, aleve  Deleted: omega red supplement  Changed: None    Medication Affordability:  Not including over the counter (OTC) medications, was there a time in the past 3 months when you did not take your medications as prescribed because of cost?: No    Allergies reviewed with patient and updates made in EHR: yes    Medication History Completed By: María Payan [facility] 11/28/2023 2:13 PM    Prior to Admission medications    Medication Sig Last Dose Taking? Auth Provider Long Term End Date   aspirin 81 MG EC tablet Take 81 mg by mouth daily 11/27/2023 at am Yes Reported, Patient     co-enzyme Q-10 100 MG CAPS capsule Take 100 mg by mouth daily 11/27/2023 at am Yes Unknown, Entered By History     Krill Oil 500 MG CAPS Take 1,000 mg by mouth daily 11/27/2023 at am Yes Unknown, Entered By History     metoprolol succinate ER (TOPROL-XL) 50 MG 24 hr tablet Take 1 tablet (50 mg) by mouth daily 11/26/2023 at pm Yes Ryan Berg MD Yes    multivitamin, therapeutic with minerals (MULTI-VITAMIN) TABS tablet Take 1 tablet by mouth daily 11/27/2023 at am Yes Reported, Patient     naphazoline-pheniramine (NAPHCON A) SOLN ophthalmic solution Place 1-2 drops into both eyes 4 times daily as needed for irritation Past Week at ? Yes Unknown, Entered By History     naproxen sodium (ANAPROX) 220 MG tablet  Take 440 mg by mouth 2 times daily as needed for moderate pain Unknown at ? Yes Unknown, Entered By History     simvastatin (ZOCOR) 40 MG tablet Take 1 tablet (40 mg) by mouth At Bedtime 11/26/2023 at pm Yes Ryan Berg MD Yes

## 2023-11-29 ENCOUNTER — ANESTHESIA (OUTPATIENT)
Dept: SURGERY | Facility: CLINIC | Age: 68
DRG: 378 | End: 2023-11-29
Payer: COMMERCIAL

## 2023-11-29 ENCOUNTER — ANESTHESIA EVENT (OUTPATIENT)
Dept: SURGERY | Facility: CLINIC | Age: 68
DRG: 378 | End: 2023-11-29
Payer: COMMERCIAL

## 2023-11-29 VITALS
BODY MASS INDEX: 32.48 KG/M2 | HEART RATE: 97 BPM | WEIGHT: 226.85 LBS | HEIGHT: 70 IN | DIASTOLIC BLOOD PRESSURE: 58 MMHG | SYSTOLIC BLOOD PRESSURE: 118 MMHG | TEMPERATURE: 98.1 F | OXYGEN SATURATION: 95 % | RESPIRATION RATE: 16 BRPM

## 2023-11-29 LAB
ANION GAP SERPL CALCULATED.3IONS-SCNC: 9 MMOL/L (ref 7–15)
BUN SERPL-MCNC: 15 MG/DL (ref 8–23)
CALCIUM SERPL-MCNC: 8.2 MG/DL (ref 8.8–10.2)
CHLORIDE SERPL-SCNC: 106 MMOL/L (ref 98–107)
CREAT SERPL-MCNC: 0.77 MG/DL (ref 0.67–1.17)
DEPRECATED HCO3 PLAS-SCNC: 23 MMOL/L (ref 22–29)
EGFRCR SERPLBLD CKD-EPI 2021: >90 ML/MIN/1.73M2
ERYTHROCYTE [DISTWIDTH] IN BLOOD BY AUTOMATED COUNT: 14.3 % (ref 10–15)
GLUCOSE SERPL-MCNC: 104 MG/DL (ref 70–99)
HCT VFR BLD AUTO: 25.9 % (ref 40–53)
HGB BLD-MCNC: 8.3 G/DL (ref 13.3–17.7)
HGB BLD-MCNC: 8.5 G/DL (ref 13.3–17.7)
HGB BLD-MCNC: 8.5 G/DL (ref 13.3–17.7)
MAGNESIUM SERPL-MCNC: 2.2 MG/DL (ref 1.7–2.3)
MCH RBC QN AUTO: 34.3 PG (ref 26.5–33)
MCHC RBC AUTO-ENTMCNC: 32.8 G/DL (ref 31.5–36.5)
MCV RBC AUTO: 104 FL (ref 78–100)
PLATELET # BLD AUTO: 185 10E3/UL (ref 150–450)
POTASSIUM SERPL-SCNC: 4.2 MMOL/L (ref 3.4–5.3)
RBC # BLD AUTO: 2.48 10E6/UL (ref 4.4–5.9)
SODIUM SERPL-SCNC: 138 MMOL/L (ref 135–145)
UPPER GI ENDOSCOPY: NORMAL
WBC # BLD AUTO: 7.9 10E3/UL (ref 4–11)

## 2023-11-29 PROCEDURE — 250N000009 HC RX 250: Performed by: INTERNAL MEDICINE

## 2023-11-29 PROCEDURE — 96361 HYDRATE IV INFUSION ADD-ON: CPT | Mod: 59

## 2023-11-29 PROCEDURE — 36415 COLL VENOUS BLD VENIPUNCTURE: CPT | Performed by: INTERNAL MEDICINE

## 2023-11-29 PROCEDURE — 85027 COMPLETE CBC AUTOMATED: CPT | Performed by: INTERNAL MEDICINE

## 2023-11-29 PROCEDURE — 82310 ASSAY OF CALCIUM: CPT | Performed by: INTERNAL MEDICINE

## 2023-11-29 PROCEDURE — 250N000009 HC RX 250: Performed by: NURSE ANESTHETIST, CERTIFIED REGISTERED

## 2023-11-29 PROCEDURE — 0DJ08ZZ INSPECTION OF UPPER INTESTINAL TRACT, VIA NATURAL OR ARTIFICIAL OPENING ENDOSCOPIC: ICD-10-PCS | Performed by: INTERNAL MEDICINE

## 2023-11-29 PROCEDURE — 99239 HOSP IP/OBS DSCHRG MGMT >30: CPT | Performed by: INTERNAL MEDICINE

## 2023-11-29 PROCEDURE — 272N000001 HC OR GENERAL SUPPLY STERILE: Performed by: INTERNAL MEDICINE

## 2023-11-29 PROCEDURE — 250N000011 HC RX IP 250 OP 636: Performed by: NURSE ANESTHETIST, CERTIFIED REGISTERED

## 2023-11-29 PROCEDURE — 83735 ASSAY OF MAGNESIUM: CPT | Performed by: INTERNAL MEDICINE

## 2023-11-29 PROCEDURE — 360N000076 HC SURGERY LEVEL 3, PER MIN: Performed by: INTERNAL MEDICINE

## 2023-11-29 PROCEDURE — G0378 HOSPITAL OBSERVATION PER HR: HCPCS

## 2023-11-29 PROCEDURE — C9113 INJ PANTOPRAZOLE SODIUM, VIA: HCPCS | Mod: JZ | Performed by: INTERNAL MEDICINE

## 2023-11-29 PROCEDURE — 370N000017 HC ANESTHESIA TECHNICAL FEE, PER MIN: Performed by: INTERNAL MEDICINE

## 2023-11-29 PROCEDURE — 96376 TX/PRO/DX INJ SAME DRUG ADON: CPT

## 2023-11-29 PROCEDURE — 999N000141 HC STATISTIC PRE-PROCEDURE NURSING ASSESSMENT: Performed by: INTERNAL MEDICINE

## 2023-11-29 PROCEDURE — 710N000009 HC RECOVERY PHASE 1, LEVEL 1, PER MIN: Performed by: INTERNAL MEDICINE

## 2023-11-29 PROCEDURE — 250N000011 HC RX IP 250 OP 636: Mod: JZ | Performed by: INTERNAL MEDICINE

## 2023-11-29 PROCEDURE — 258N000003 HC RX IP 258 OP 636: Performed by: INTERNAL MEDICINE

## 2023-11-29 PROCEDURE — 258N000003 HC RX IP 258 OP 636: Performed by: ANESTHESIOLOGY

## 2023-11-29 PROCEDURE — 250N000013 HC RX MED GY IP 250 OP 250 PS 637: Performed by: INTERNAL MEDICINE

## 2023-11-29 PROCEDURE — 85018 HEMOGLOBIN: CPT | Performed by: INTERNAL MEDICINE

## 2023-11-29 RX ORDER — OXYCODONE HYDROCHLORIDE 5 MG/1
5 TABLET ORAL EVERY 4 HOURS PRN
Status: DISCONTINUED | OUTPATIENT
Start: 2023-11-29 | End: 2023-11-29 | Stop reason: HOSPADM

## 2023-11-29 RX ORDER — DIAZEPAM 10 MG/2ML
2.5 INJECTION, SOLUTION INTRAMUSCULAR; INTRAVENOUS
Status: DISCONTINUED | OUTPATIENT
Start: 2023-11-29 | End: 2023-11-29 | Stop reason: HOSPADM

## 2023-11-29 RX ORDER — SODIUM CHLORIDE, SODIUM LACTATE, POTASSIUM CHLORIDE, CALCIUM CHLORIDE 600; 310; 30; 20 MG/100ML; MG/100ML; MG/100ML; MG/100ML
INJECTION, SOLUTION INTRAVENOUS CONTINUOUS
Status: DISCONTINUED | OUTPATIENT
Start: 2023-11-29 | End: 2023-11-29 | Stop reason: HOSPADM

## 2023-11-29 RX ORDER — FENTANYL CITRATE 50 UG/ML
INJECTION, SOLUTION INTRAMUSCULAR; INTRAVENOUS PRN
Status: DISCONTINUED | OUTPATIENT
Start: 2023-11-29 | End: 2023-11-29

## 2023-11-29 RX ORDER — OXYCODONE HYDROCHLORIDE 5 MG/1
10 TABLET ORAL EVERY 4 HOURS PRN
Status: DISCONTINUED | OUTPATIENT
Start: 2023-11-29 | End: 2023-11-29 | Stop reason: HOSPADM

## 2023-11-29 RX ORDER — FENTANYL CITRATE 50 UG/ML
50 INJECTION, SOLUTION INTRAMUSCULAR; INTRAVENOUS EVERY 5 MIN PRN
Status: DISCONTINUED | OUTPATIENT
Start: 2023-11-29 | End: 2023-11-29 | Stop reason: HOSPADM

## 2023-11-29 RX ORDER — HYDROMORPHONE HCL IN WATER/PF 6 MG/30 ML
0.4 PATIENT CONTROLLED ANALGESIA SYRINGE INTRAVENOUS EVERY 5 MIN PRN
Status: DISCONTINUED | OUTPATIENT
Start: 2023-11-29 | End: 2023-11-29 | Stop reason: HOSPADM

## 2023-11-29 RX ORDER — FENTANYL CITRATE 50 UG/ML
25 INJECTION, SOLUTION INTRAMUSCULAR; INTRAVENOUS
Status: DISCONTINUED | OUTPATIENT
Start: 2023-11-29 | End: 2023-11-29 | Stop reason: HOSPADM

## 2023-11-29 RX ORDER — DIPHENHYDRAMINE HCL 25 MG
25 CAPSULE ORAL EVERY 6 HOURS PRN
Status: DISCONTINUED | OUTPATIENT
Start: 2023-11-29 | End: 2023-11-29 | Stop reason: HOSPADM

## 2023-11-29 RX ORDER — ONDANSETRON 4 MG/1
4 TABLET, ORALLY DISINTEGRATING ORAL EVERY 30 MIN PRN
Status: DISCONTINUED | OUTPATIENT
Start: 2023-11-29 | End: 2023-11-29 | Stop reason: HOSPADM

## 2023-11-29 RX ORDER — ONDANSETRON 2 MG/ML
4 INJECTION INTRAMUSCULAR; INTRAVENOUS EVERY 30 MIN PRN
Status: DISCONTINUED | OUTPATIENT
Start: 2023-11-29 | End: 2023-11-29 | Stop reason: HOSPADM

## 2023-11-29 RX ORDER — FENTANYL CITRATE 50 UG/ML
25 INJECTION, SOLUTION INTRAMUSCULAR; INTRAVENOUS EVERY 5 MIN PRN
Status: DISCONTINUED | OUTPATIENT
Start: 2023-11-29 | End: 2023-11-29 | Stop reason: HOSPADM

## 2023-11-29 RX ORDER — PANTOPRAZOLE SODIUM 40 MG/1
40 TABLET, DELAYED RELEASE ORAL 2 TIMES DAILY
Qty: 60 TABLET | Refills: 0 | Status: SHIPPED | OUTPATIENT
Start: 2023-11-29

## 2023-11-29 RX ORDER — NALOXONE HYDROCHLORIDE 0.4 MG/ML
0.4 INJECTION, SOLUTION INTRAMUSCULAR; INTRAVENOUS; SUBCUTANEOUS
Status: DISCONTINUED | OUTPATIENT
Start: 2023-11-29 | End: 2023-11-29 | Stop reason: HOSPADM

## 2023-11-29 RX ORDER — LIDOCAINE 40 MG/G
CREAM TOPICAL
Status: DISCONTINUED | OUTPATIENT
Start: 2023-11-29 | End: 2023-11-29 | Stop reason: HOSPADM

## 2023-11-29 RX ORDER — PROPOFOL 10 MG/ML
INJECTION, EMULSION INTRAVENOUS PRN
Status: DISCONTINUED | OUTPATIENT
Start: 2023-11-29 | End: 2023-11-29

## 2023-11-29 RX ORDER — FLUMAZENIL 0.1 MG/ML
0.2 INJECTION, SOLUTION INTRAVENOUS
Status: DISCONTINUED | OUTPATIENT
Start: 2023-11-29 | End: 2023-11-29 | Stop reason: HOSPADM

## 2023-11-29 RX ORDER — MEPERIDINE HYDROCHLORIDE 25 MG/ML
12.5 INJECTION INTRAMUSCULAR; INTRAVENOUS; SUBCUTANEOUS EVERY 5 MIN PRN
Status: DISCONTINUED | OUTPATIENT
Start: 2023-11-29 | End: 2023-11-29 | Stop reason: HOSPADM

## 2023-11-29 RX ORDER — DEXAMETHASONE SODIUM PHOSPHATE 4 MG/ML
4 INJECTION, SOLUTION INTRA-ARTICULAR; INTRALESIONAL; INTRAMUSCULAR; INTRAVENOUS; SOFT TISSUE
Status: DISCONTINUED | OUTPATIENT
Start: 2023-11-29 | End: 2023-11-29 | Stop reason: HOSPADM

## 2023-11-29 RX ORDER — NALOXONE HYDROCHLORIDE 0.4 MG/ML
0.2 INJECTION, SOLUTION INTRAMUSCULAR; INTRAVENOUS; SUBCUTANEOUS
Status: DISCONTINUED | OUTPATIENT
Start: 2023-11-29 | End: 2023-11-29 | Stop reason: HOSPADM

## 2023-11-29 RX ORDER — LABETALOL HYDROCHLORIDE 5 MG/ML
10 INJECTION, SOLUTION INTRAVENOUS EVERY 10 MIN PRN
Status: DISCONTINUED | OUTPATIENT
Start: 2023-11-29 | End: 2023-11-29 | Stop reason: HOSPADM

## 2023-11-29 RX ORDER — DIPHENHYDRAMINE HYDROCHLORIDE 50 MG/ML
25 INJECTION INTRAMUSCULAR; INTRAVENOUS EVERY 6 HOURS PRN
Status: DISCONTINUED | OUTPATIENT
Start: 2023-11-29 | End: 2023-11-29 | Stop reason: HOSPADM

## 2023-11-29 RX ORDER — HYDROMORPHONE HCL IN WATER/PF 6 MG/30 ML
0.2 PATIENT CONTROLLED ANALGESIA SYRINGE INTRAVENOUS EVERY 5 MIN PRN
Status: DISCONTINUED | OUTPATIENT
Start: 2023-11-29 | End: 2023-11-29 | Stop reason: HOSPADM

## 2023-11-29 RX ORDER — LIDOCAINE HYDROCHLORIDE 20 MG/ML
INJECTION, SOLUTION INFILTRATION; PERINEURAL PRN
Status: DISCONTINUED | OUTPATIENT
Start: 2023-11-29 | End: 2023-11-29

## 2023-11-29 RX ORDER — ALBUTEROL SULFATE 0.83 MG/ML
2.5 SOLUTION RESPIRATORY (INHALATION) EVERY 4 HOURS PRN
Status: DISCONTINUED | OUTPATIENT
Start: 2023-11-29 | End: 2023-11-29 | Stop reason: HOSPADM

## 2023-11-29 RX ADMIN — SODIUM CHLORIDE: 9 INJECTION, SOLUTION INTRAVENOUS at 00:08

## 2023-11-29 RX ADMIN — SODIUM CHLORIDE, POTASSIUM CHLORIDE, SODIUM LACTATE AND CALCIUM CHLORIDE: 600; 310; 30; 20 INJECTION, SOLUTION INTRAVENOUS at 12:11

## 2023-11-29 RX ADMIN — ONDANSETRON 4 MG: 2 INJECTION INTRAMUSCULAR; INTRAVENOUS at 12:37

## 2023-11-29 RX ADMIN — PROPOFOL 150 MCG/KG/MIN: 10 INJECTION, EMULSION INTRAVENOUS at 12:18

## 2023-11-29 RX ADMIN — FENTANYL CITRATE 50 MCG: 50 INJECTION INTRAMUSCULAR; INTRAVENOUS at 12:15

## 2023-11-29 RX ADMIN — LIDOCAINE HYDROCHLORIDE 30 MG: 20 INJECTION, SOLUTION INFILTRATION; PERINEURAL at 12:18

## 2023-11-29 RX ADMIN — PANTOPRAZOLE SODIUM 40 MG: 40 INJECTION, POWDER, FOR SOLUTION INTRAVENOUS at 09:23

## 2023-11-29 RX ADMIN — ACETAMINOPHEN 650 MG: 325 TABLET, FILM COATED ORAL at 09:23

## 2023-11-29 RX ADMIN — MIDAZOLAM 2 MG: 1 INJECTION INTRAMUSCULAR; INTRAVENOUS at 12:11

## 2023-11-29 RX ADMIN — SODIUM CHLORIDE, POTASSIUM CHLORIDE, SODIUM LACTATE AND CALCIUM CHLORIDE: 600; 310; 30; 20 INJECTION, SOLUTION INTRAVENOUS at 10:34

## 2023-11-29 RX ADMIN — ACETAMINOPHEN 650 MG: 325 TABLET, FILM COATED ORAL at 04:26

## 2023-11-29 ASSESSMENT — ACTIVITIES OF DAILY LIVING (ADL)
ADLS_ACUITY_SCORE: 24
ADLS_ACUITY_SCORE: 23
ADLS_ACUITY_SCORE: 24
ADLS_ACUITY_SCORE: 23
ADLS_ACUITY_SCORE: 24
ADLS_ACUITY_SCORE: 24
ADLS_ACUITY_SCORE: 23

## 2023-11-29 NOTE — ANESTHESIA CARE TRANSFER NOTE
Patient: Isaiah Miller    Procedure: Procedure(s):  ESOPHAGOGASTRODUODENOSCOPY       Diagnosis: Upper GI bleed [K92.2]  Diagnosis Additional Information: No value filed.    Anesthesia Type:   MAC     Note:    Oropharynx: oropharynx clear of all foreign objects  Level of Consciousness: awake  Oxygen Supplementation: room air    Independent Airway: airway patency satisfactory and stable  Dentition: dentition unchanged  Vital Signs Stable: post-procedure vital signs reviewed and stable  Report to RN Given: handoff report given  Patient transferred to: PACU    Handoff Report: Identifed the Patient, Identified the Reponsible Provider, Reviewed the pertinent medical history, Discussed the surgical course, Reviewed Intra-OP anesthesia mangement and issues during anesthesia, Set expectations for post-procedure period and Allowed opportunity for questions and acknowledgement of understanding      Vitals:  Vitals Value Taken Time   /62 11/29/23 1241   Temp     Pulse 90 11/29/23 1242   Resp 18 11/29/23 1242   SpO2 96 % 11/29/23 1242   Vitals shown include unfiled device data.    Electronically Signed By: SURYA Rivas CRNA  November 29, 2023  12:44 PM

## 2023-11-29 NOTE — ANESTHESIA PREPROCEDURE EVALUATION
Anesthesia Pre-Procedure Evaluation    Patient: Isaiah Miller   MRN: [id number] : 1955        Procedure : Procedure(s):  ESOPHAGOGASTRODUODENOSCOPY          Past Medical History:   Diagnosis Date    CAD (coronary artery disease)     HTN (hypertension)     Hyperlipemia       History reviewed. No pertinent surgical history.   Allergies   Allergen Reactions    Bactrim [Sulfamethoxazole-Trimethoprim] Rash    Tamiflu [Oseltamivir] Rash      Social History     Tobacco Use    Smoking status: Never    Smokeless tobacco: Never   Substance Use Topics    Alcohol use: Not on file      Wt Readings from Last 1 Encounters:   23 102.9 kg (226 lb 13.7 oz)        Anesthesia Evaluation   Pt has had prior anesthetic. Type: General.    No history of anesthetic complications       ROS/MED HX  ENT/Pulmonary:  - neg pulmonary ROS     Neurologic:  - neg neurologic ROS     Cardiovascular:     (+)  hypertension- -  CAD -  - stent-                                      METS/Exercise Tolerance:     Hematologic:  - neg hematologic  ROS     Musculoskeletal:  - neg musculoskeletal ROS     GI/Hepatic:  - neg GI/hepatic ROS   (+) GERD, Asymptomatic on medication, esophageal disease,                 Renal/Genitourinary:  - neg Renal ROS     Endo:  - neg endo ROS     Psychiatric/Substance Use:  - neg psychiatric ROS     Infectious Disease:  - neg infectious disease ROS     Malignancy:  - neg malignancy ROS     Other:  - neg other ROS          Physical Exam    Airway        Mallampati: I   TM distance: > 3 FB   Neck ROM: full   Mouth opening: > 3 cm    Respiratory Devices and Support         Dental  no notable dental history         Cardiovascular   cardiovascular exam normal          Pulmonary   pulmonary exam normal                OUTSIDE LABS:  CBC:   Lab Results   Component Value Date    WBC 7.9 2023    WBC 11.4 (H) 2023    HGB 8.5 (L) 2023    HGB 8.5 (L) 2023    HCT 25.9 (L) 2023    HCT 32.1 (L)  "11/28/2023     11/29/2023     11/28/2023     BMP:   Lab Results   Component Value Date     11/29/2023     11/28/2023    POTASSIUM 4.2 11/29/2023    POTASSIUM 3.8 11/28/2023    CHLORIDE 106 11/29/2023    CHLORIDE 103 11/28/2023    CO2 23 11/29/2023    CO2 22 11/28/2023    BUN 15.0 11/29/2023    BUN 30.8 (H) 11/28/2023    CR 0.77 11/29/2023    CR 0.80 11/28/2023     (H) 11/29/2023     (H) 11/28/2023     COAGS:   Lab Results   Component Value Date    PTT 28 01/22/2007    INR 0.91 01/22/2007     POC: No results found for: \"BGM\", \"HCG\", \"HCGS\"  HEPATIC:   Lab Results   Component Value Date    ALBUMIN 4.2 11/28/2023    PROTTOTAL 6.8 11/28/2023    ALT 34 11/28/2023    AST 23 11/28/2023    ALKPHOS 50 11/28/2023    BILITOTAL 0.4 11/28/2023     OTHER:   Lab Results   Component Value Date    MONIQUE 8.2 (L) 11/29/2023    MAG 2.2 11/29/2023    LIPASE 23 11/28/2023       Anesthesia Plan    ASA Status:  3    NPO Status:  NPO Appropriate    Anesthesia Type: MAC.              Consents    Anesthesia Plan(s) and associated risks, benefits, and realistic alternatives discussed. Questions answered and patient/representative(s) expressed understanding.     - Discussed:     - Discussed with:  Patient            Postoperative Care    Pain management: IV analgesics, Oral pain medications, Multi-modal analgesia.   PONV prophylaxis: Ondansetron (or other 5HT-3), Dexamethasone or Solumedrol     Comments:               Jordan Cabrera MD    I have reviewed the pertinent notes and labs in the chart from the past 30 days and (re)examined the patient.  Any updates or changes from those notes are reflected in this note.       # Hypocalcemia: Lowest Ca = 8.2 mg/dL in last 2 days, will monitor and replace as appropriate       # Drug Induced Platelet Defect: home medication list includes an antiplatelet medication  # Obesity: Estimated body mass index is 32.55 kg/m  as calculated from the following:    " "Height as of this encounter: 1.778 m (5' 10\").    Weight as of this encounter: 102.9 kg (226 lb 13.7 oz).      "

## 2023-11-29 NOTE — PLAN OF CARE
Pertinent assessments: VSS on room air. Afebrile. A&Ox4. Up with SBA. IV fluids per orders. Pt reports black colored stools. BS are normoactive. Last BM today. Passing flatus. Voiding spontaneously. NPO.    Major Shift Events: GI consult completed.    Treatment Plan: GI following, trend Hgb, EGD tomorrow, IV fluids, NPO, and discharge pending.    Bedside Nurse: Faheem Bowling RN

## 2023-11-29 NOTE — PLAN OF CARE
For vital signs and complete assessments, please see documentation flowsheets.     [id number]  Pertinent assessments: Pt A&Ox4. SBA in room. On RA. Afebrile, VSS. Denies nausea. C/O  SOB with movement. C/O back pain & headache. Given prn tylenol po x1.PIV infusing NS @100 mls/hr.    Major Shift Events: NONE    Treatment Plan: Pain management. Monitor Hgb. EGD 11/29/23. IVF. GI Folllowing    Bedside Nurse: Juice Newton RN

## 2023-11-29 NOTE — PROGRESS NOTES
[facility]    Hospitalist Progress Note  Name: Isaiah Miller    MRN: [id number]  Provider:  Antonio Huang DO  Date of Service: 11/29/2023    Summary of Stay: Isaiah Miller is a 68 year old male admitted on 11/28/2023. He has a past medical history of CVD s/p stent placement in 2007, Baltazar's esophagus, GERD with gastric fundoplication, hypertension, IVONE, and hyperlipidemia and is admitted for hematemesis and melena. He began to notice black, tarry stools 4 days ago and experienced coffee-ground emesis 3x the evening of 11/27. He also notes recent-onset abdominal pain, dizziness and chest pain and his wife reports that he appeared pale and diaphoretic. Patient reports increased heartburn, upset stomach, and diarrhea for the past 4-6 weeks and fatigue for the last 2 weeks. He does not drink alcohol every day but does report recent alcohol use (3-4 drinks/day) in the last week. No NSAID use. No prior history of GI bleed. In the ED, BP was 151/96, Temp 97.8F, Pulse 127, Resp 26, O2 100% on room air. CMP was grossly normal with the exception of elevated BUN at 30.8 and mildly elevated glucose at 149. CBC indicated WBC at 11.4, Hgb at 10.4, Platelets at 251. EKG showed sinus tachycardia with no other abnormalities. CT Chest/Abd/Pelvis without acute findings. He was given Protonix 40mg IV, NS 1L bolus, Zofran 4mg IV. Subsequent vitals signs indicated pulse 90, /54. Type and screen ordered.      TODAY'S PLAN: Discussed with gastroenterology, EGD today without overt signs of bleeding but did note gastric erosions and suture with adherent material in the area that was not well visualized.  Plan is for advanced to clear liquid diet.  If tolerates clear liquids, anticipate discharge home this afternoon.    Problem List:   Acute Intractable Abdominal pain  Melena  Hematemesis  Most likely upper GI bleed due to ulcer, less likely gastritis or gastric or esophageal varices, unlikely  Diandra-Car tear. Given insidious onset of abdominal discomfort and fatigue, progressive melena and hematemesis, recent alcohol use, most likely bleeding due to duodenal or gastric ulcer. Less likely gastritis given lack of fever and less likely varices given no known history of varices or liver disease. Unlikely Diandra Car tear given presence of melena and lack of bright red blood on hematemesis.  - GI consult ordered  - Continue Protonix IV 40mg BID  - Start IVF  - NPO for likely endoscopy   - Zofran PRN  - Check Hgb q8 hours  - Tylenol 650mg PRN  - Daily BMP  - Advise etoh cessation     Dizziness  Fatigue  Acute Blood Loss Anemia  Most likely related to upper GI bleed.  - Start IVF to maintain volume status  - Type and screen ordered, consent signed for possible PRBC transfusion  - Check Hgb q8 hours  - Transfuse for hgb less than 7  - Daily CBC     Hypertension  - Hold metoprolol in the setting of GIB.  Anticipate restarting tomorrow     Chronic Medical Problems:  Baltazar's Esophagus    I spent 53 minutes in reviewing this patient's labs, imaging, medications, medical history.  In addition time was spent interviewing the patient, communicating with family, and medical decision making.     DVT Prophylaxis: Pneumatic Compression Devices  Code Status: Full Code  Diet: Clear Liquid Diet    Le Catheter: Not present  Disposition: Expected discharge today to home. Goals prior to discharge include tolerating clear liquid diet.   Family updated today: Yes      Interval History   Pt seen and examined.  Pt denies any further bowel movements or vomiting.    -Data reviewed today: I personally reviewed all new labs and imaging results over the last 24 hours.     Physical Exam   Temp: 98  F (36.7  C) Temp src: Oral BP: 132/58 Pulse: 94   Resp: 16 SpO2: 99 % O2 Device: None (Room air)    Vitals:    11/28/23 0910   Weight: 102.9 kg (226 lb 13.7 oz)     Vital Signs with Ranges  Temp:  [97  F (36.1  C)-99.2  F (37.3  C)]  "98  F (36.7  C)  Pulse:  [] 94  Resp:  [12-46] 16  BP: (111-138)/(52-76) 132/58  SpO2:  [94 %-100 %] 99 %  No intake/output data recorded.    GENERAL: No apparent distress. Awake, alert, and fully oriented.  HEENT: Normocephalic, atraumatic. Extraocular movements intact.  CARDIOVASCULAR: Regular rate and rhythm without murmurs or rubs. No S3.  PULMONARY: Clear bilaterally.  GASTROINTESTINAL: Soft, non-tender, non-distended. Bowel sounds normoactive.   EXTREMITIES: No cyanosis or clubbing. No edema.  NEUROLOGICAL: CN 2-12 grossly intact, no focal neurological deficits.  DERMATOLOGICAL: No rash, ulcer, bruising, nor jaundice.    Medications      pantoprazole  40 mg Intravenous BID    sodium chloride (PF)  3 mL Intracatheter Q8H     Data     Laboratory:  Recent Labs   Lab 11/29/23  1335 11/29/23  0634 11/28/23  2154 11/28/23  1431 11/28/23  0926   WBC  --  7.9  --   --  11.4*   HGB 8.3* 8.5*  8.5* 8.4*   < > 10.4*   HCT  --  25.9*  --   --  32.1*   MCV  --  104*  --   --  103*   PLT  --  185  --   --  251    < > = values in this interval not displayed.     Recent Labs   Lab 11/29/23  0634 11/28/23  0926    140   POTASSIUM 4.2 3.8   CHLORIDE 106 103   CO2 23 22   ANIONGAP 9 15   * 149*   BUN 15.0 30.8*   CR 0.77 0.80   GFRESTIMATED >90 >90   MONIQUE 8.2* 8.8     No results for input(s): \"CULT\" in the last 168 hours.    Imaging:  No results found for this or any previous visit (from the past 24 hour(s)).      Antonio Huang DO  [facility] Hospitalist  [address] [place] [address]  Securely message with [email or web address] (more info)  Text page via [email or web address] Paging/Directory   11/29/2023   "

## 2023-11-29 NOTE — PLAN OF CARE
Discharge Note    Patient discharged to home via private vehicle  accompanied by significant other .  IV: Discontinued  Prescriptions filled and given to patient/family.   Belongings reviewed and sent with patient and family.   Home medications returned to patient: NA  Equipment sent with: patient, N/A.   patient and family verbalizes understanding of discharge instructions. AVS given to patient and family.  Additional education completed?  Follow-up care and appts  Pt is discharged to home.  Pt is a/o, denies pain, alex clear liquid diet well VSS.  Discharge instructions explained to pt and verbally acknowledged.  All valuables with pt at time of discharge.  Pt left with family via automobile.    Faye Sagastume RN

## 2023-11-29 NOTE — ANESTHESIA POSTPROCEDURE EVALUATION
Patient: Isaiah Miller    Procedure: Procedure(s):  ESOPHAGOGASTRODUODENOSCOPY       Anesthesia Type:  MAC    Note:     Postop Pain Control: Uneventful            Sign Out: Well controlled pain   PONV: No   Neuro/Psych: Uneventful            Sign Out: Acceptable/Baseline neuro status   Airway/Respiratory: Uneventful            Sign Out: Acceptable/Baseline resp. status   CV/Hemodynamics: Uneventful            Sign Out: Acceptable CV status   Other NRE: NONE   DID A NON-ROUTINE EVENT OCCUR? No           Last vitals:  Vitals Value Taken Time   /70 11/29/23 1300   Temp 97.7  F (36.5  C) 11/29/23 1241   Pulse 86 11/29/23 1308   Resp 34 11/29/23 1308   SpO2 96 % 11/29/23 1309   Vitals shown include unfiled device data.    Electronically Signed By: Jordan Cabrera MD  November 29, 2023  2:39 PM

## 2023-12-02 NOTE — DISCHARGE SUMMARY
Hospitalist Discharge Summary  [facility]    Isaiah Miller MRN# [id number]   YOB: 1955 Age: 68 year old     Date of Admission:  11/28/2023  Date of Discharge:  11/29/2023  5:19 PM  Admitting Physician:  Antonio Huang DO  Discharge Physician:  Antonio Huang DO  Discharging Service:  Hospitalist     Primary Provider: [facility]          Discharge Diagnosis:     Acute Intractable Abdominal pain  Melena  Hematemesis  Gastric Erosions  Hx of Nissen Fundoplication  Most likely upper GI bleed due to ulcer, less likely gastritis or gastric or esophageal varices, unlikely Diandra-Car tear. Given insidious onset of abdominal discomfort and fatigue, progressive melena and hematemesis, recent alcohol use, most likely bleeding due to duodenal or gastric ulcer. Less likely gastritis given lack of fever and less likely varices given no known history of varices or liver disease. Unlikely Diandra Car tear given presence of melena and lack of bright red blood on hematemesis.  - GI consult ordered  - Continue Protonix IV 40mg BID  - Start IVF  - NPO for likely endoscopy   - Zofran PRN  - Check Hgb q8 hours  - Tylenol 650mg PRN  - Daily BMP  - Advise etoh cessation     Dizziness  Fatigue  Acute Blood Loss Anemia  Most likely related to upper GI bleed.  - Start IVF to maintain volume status  - Type and screen ordered, consent signed for possible PRBC transfusion  - Check Hgb q8 hours  - Transfuse for hgb less than 7  - Daily CBC     Hypertension  - Hold metoprolol in the setting of GIB.  Anticipate restarting tomorrow     Chronic Medical Problems:  Baltazar's Esophagus             Discharge Disposition:     Discharged to home           Hospital Course:      Isaiah Miller is a 68 year old male admitted on 11/28/2023. He has a past medical history of CVD s/p stent placement in 2007, Baltazar's esophagus, GERD with gastric fundoplication, hypertension, IVONE, and  hyperlipidemia and is admitted for hematemesis and melena. He began to notice black, tarry stools 4 days ago and experienced coffee-ground emesis 3x the evening of 11/27. He also notes recent-onset abdominal pain, dizziness and chest pain and his wife reports that he appeared pale and diaphoretic. Patient reports increased heartburn, upset stomach, and diarrhea for the past 4-6 weeks and fatigue for the last 2 weeks. He does not drink alcohol every day but does report recent alcohol use (3-4 drinks/day) in the last week. No NSAID use. No prior history of GI bleed. In the ED, BP was 151/96, Temp 97.8F, Pulse 127, Resp 26, O2 100% on room air. CMP was grossly normal with the exception of elevated BUN at 30.8 and mildly elevated glucose at 149. CBC indicated WBC at 11.4, Hgb at 10.4, Platelets at 251. EKG showed sinus tachycardia with no other abnormalities. CT Chest/Abd/Pelvis without acute findings. He was given Protonix 40mg IV, NS 1L bolus, Zofran 4mg IV. Subsequent vitals signs indicated pulse 90, /54. Type and screen ordered.   On 11/29, the patient had an EGD showing gastric erosions and evidence of Niesen fundoplication with poor visualization of portion.  Pantoprazole 40 mg twice daily for 2 months was recommended and repeat endoscopy in 2 months was recommended to the patient.  On 11/29, the patient's hemoglobin was stable and he tolerated a clear liquid diet.  At that point he was discharged home to follow-up as an outpatient.    The patient was seen, examined, and counseled on this day. The hospitalization and plan of care was reviewed with the patient extensively. All questions were addressed and the patient agreed to follow-up as noted above.           Allergies:     Allergies   Allergen Reactions    Bactrim [Sulfamethoxazole-Trimethoprim] Rash    Tamiflu [Oseltamivir] Rash              Discharge Medications:     Discharge Medication List as of 11/29/2023  5:01 PM        START taking these  "medications    Details   pantoprazole (PROTONIX) 40 MG EC tablet Take 1 tablet (40 mg) by mouth 2 times daily, Disp-60 tablet, R-0, E-Prescribe           CONTINUE these medications which have NOT CHANGED    Details   co-enzyme Q-10 100 MG CAPS capsule Take 100 mg by mouth daily, Historical      Krill Oil 500 MG CAPS Take 1,000 mg by mouth daily, Historical      metoprolol succinate ER (TOPROL-XL) 50 MG 24 hr tablet Take 1 tablet (50 mg) by mouth daily, Disp-90 tablet,R-3, E-Prescribe      multivitamin, therapeutic with minerals (MULTI-VITAMIN) TABS tablet Take 1 tablet by mouth daily, Historical      naphazoline-pheniramine (NAPHCON A) SOLN ophthalmic solution Place 1-2 drops into both eyes 4 times daily as needed for irritation, Historical      simvastatin (ZOCOR) 40 MG tablet Take 1 tablet (40 mg) by mouth At Bedtime, Disp-90 tablet,R-1, E-Prescribe           STOP taking these medications       aspirin 81 MG EC tablet Comments:   Reason for Stopping:         naproxen sodium (ANAPROX) 220 MG tablet Comments:   Reason for Stopping:                      Condition on Discharge:     Discharge condition: Fair   Discharge vitals: Blood pressure 118/58, pulse 97, temperature 98.1  F (36.7  C), temperature source Oral, resp. rate 16, height 1.778 m (5' 10\"), weight 102.9 kg (226 lb 13.7 oz), SpO2 95%.   Code status on discharge: Full Code      BASIC PHYSICAL EXAMINATION:  GENERAL: No apparent distress.  CARDIOVASCULAR: Regular rate and rhythm without murmurs.  PULMONARY: Clear to auscultation bilaterally.   GASTROINTESTINAL: Abdomen soft, non-tender.  EXTREMITIES: No edema, pulses intact.  NEUROLOGIC: No focal deficits.            History of Illness:   See detailed admission note for full details.               Procedures excluding imaging which is summarized below:     Please see details in the electronic medical record.           Consultations:     GASTROENTEROLOGY IP CONSULT  GASTROENTEROLOGY IP CONSULT          " Significant Results:     Results for orders placed or performed during the hospital encounter of 11/28/23   CT Chest/Abdomen/Pelvis w Contrast    Narrative    CT CHEST/ABDOMEN/PELVIS W CONTRAST 11/28/2023 10:12 AM    CLINICAL HISTORY: black emesis and stools, chest pain and abdominal  pain, history of barretts esophagus    TECHNIQUE: CT scan of the chest, abdomen, and pelvis was performed  following injection of IV contrast. Multiplanar reformats were  obtained. Dose reduction techniques were used.   CONTRAST: 100mL Isovue-370    COMPARISON: Esophagram 11/20/2003.    FINDINGS:   LUNGS AND PLEURA: No consolidation. Minimal linear atelectasis in the  right middle lobe. No suspicious pulmonary nodules or masses. No  pleural effusions or pneumothorax.    MEDIASTINUM/AXILLAE: Visualized thyroid gland is unremarkable. No  thoracic adenopathy. No cardiomegaly. No pericardial effusion. Normal  caliber thoracic aorta. Aortic atherosclerosis. Moderate coronary  artery calcifications with possible previous intervention. Although  not a dedicated PE study, no gross PE.    HEPATOBILIARY: Diffuse low-attenuation in the liver consistent with  steatosis.    PANCREAS: Normal.    SPLEEN: Normal.    ADRENAL GLANDS: Normal.    KIDNEYS/BLADDER: There are a couple of nonobstructive bilateral renal  calculi, measuring up to 4 mm in size. No hydronephrosis. Urinary  bladder unremarkable.    BOWEL: Postoperative changes at the gastroesophageal junction. No  small bowel obstruction. Descending and sigmoid diverticulosis.    PELVIC ORGANS: Prostate unremarkable. Vas deferens calcifications.    ADDITIONAL FINDINGS: No abdominal pelvic lymphadenopathy. No ascites.  No free air. Aortic atherosclerosis. No abdominal aortic aneurysm.  There are superficial varicosities in the upper thighs.    MUSCULOSKELETAL: Normal.      Impression    IMPRESSION:  1.  No acute findings in the visualized chest, abdomen, or pelvis.    OWEN SKINNER MD          SYSTEM ID:  [id number]       Transthoracic Echocardiogram Results:  No results found for this or any previous visit (from the past 4320 hour(s)).             Pending Results:     Unresulted Labs Ordered in the Past 30 Days of this Admission       No orders found from 10/29/2023 to 11/29/2023.                        Discharge Instructions and Follow-Up:     Discharge instructions and follow-up:   Discharge Procedure Orders   Reason for your hospital stay   Order Comments: Gastric Erosions     Activity   Order Comments: Your activity upon discharge: activity as tolerated     Order Specific Question Answer Comments   Is discharge order? Yes      Follow-up and recommended labs and tests    Order Comments: Follow up with primary care provider, [facility], within 7 days for hospital follow- up.  The following labs/tests are recommended: CBC.    Recommend you follow-up with [facility] in 2 months for a repeat EGD.    Avoid alcohol and NSAIDs like ibuprofen, aleve as these will worsen your gastric issues.  Follow a bland diet.    You may resume your aspirin 81 mg daily in 1 week if your repeat CBC with your primary care doctor shows a stable blood count.     Diet   Order Comments: Follow this diet upon discharge: Orders Placed This Encounter      Regular Diet     Order Specific Question Answer Comments   Is discharge order? Yes           Total time spent in face to face contact with the patient and coordinating discharge was:  35 Minutes    DO JUANCHO Moe Hospitalist  [address] [place] [address]  12/02/2023

## 2024-06-17 PROBLEM — Z76.89 HEALTH CARE HOME: Status: RESOLVED | Noted: 2020-08-25 | Resolved: 2024-06-17

## 2024-08-04 ENCOUNTER — HEALTH MAINTENANCE LETTER (OUTPATIENT)
Age: 69
End: 2024-08-04

## 2024-11-26 ENCOUNTER — APPOINTMENT (OUTPATIENT)
Dept: ULTRASOUND IMAGING | Facility: CLINIC | Age: 69
End: 2024-11-26
Attending: EMERGENCY MEDICINE
Payer: COMMERCIAL

## 2024-11-26 ENCOUNTER — HOSPITAL ENCOUNTER (EMERGENCY)
Facility: CLINIC | Age: 69
Discharge: HOME | End: 2024-11-26
Attending: EMERGENCY MEDICINE | Admitting: EMERGENCY MEDICINE
Payer: COMMERCIAL

## 2024-11-26 VITALS
DIASTOLIC BLOOD PRESSURE: 86 MMHG | OXYGEN SATURATION: 97 % | SYSTOLIC BLOOD PRESSURE: 156 MMHG | HEART RATE: 79 BPM | TEMPERATURE: 97.5 F | RESPIRATION RATE: 18 BRPM

## 2024-11-26 DIAGNOSIS — T14.8XXA CONTUSION OF SOFT TISSUE: ICD-10-CM

## 2024-11-26 DIAGNOSIS — M79.89 HAND SWELLING: ICD-10-CM

## 2024-11-26 PROCEDURE — 250N000013 HC RX MED GY IP 250 OP 250 PS 637: Performed by: EMERGENCY MEDICINE

## 2024-11-26 PROCEDURE — 99284 EMERGENCY DEPT VISIT MOD MDM: CPT | Mod: 25

## 2024-11-26 PROCEDURE — 93971 EXTREMITY STUDY: CPT | Mod: RT

## 2024-11-26 RX ORDER — ACETAMINOPHEN 325 MG/1
975 TABLET ORAL ONCE
Status: COMPLETED | OUTPATIENT
Start: 2024-11-26 | End: 2024-11-26

## 2024-11-26 RX ORDER — OXYCODONE HYDROCHLORIDE 5 MG/1
5 TABLET ORAL ONCE
Status: COMPLETED | OUTPATIENT
Start: 2024-11-26 | End: 2024-11-26

## 2024-11-26 RX ORDER — OXYCODONE HYDROCHLORIDE 5 MG/1
5 TABLET ORAL EVERY 6 HOURS PRN
Qty: 6 TABLET | Refills: 0 | Status: SHIPPED | OUTPATIENT
Start: 2024-11-26 | End: 2024-11-29

## 2024-11-26 RX ADMIN — OXYCODONE HYDROCHLORIDE 5 MG: 5 TABLET ORAL at 12:15

## 2024-11-26 RX ADMIN — ACETAMINOPHEN 975 MG: 325 TABLET, FILM COATED ORAL at 12:15

## 2024-11-26 ASSESSMENT — ACTIVITIES OF DAILY LIVING (ADL)
ADLS_ACUITY_SCORE: 51

## 2024-11-26 ASSESSMENT — COLUMBIA-SUICIDE SEVERITY RATING SCALE - C-SSRS
6. HAVE YOU EVER DONE ANYTHING, STARTED TO DO ANYTHING, OR PREPARED TO DO ANYTHING TO END YOUR LIFE?: NO
2. HAVE YOU ACTUALLY HAD ANY THOUGHTS OF KILLING YOURSELF IN THE PAST MONTH?: NO
1. IN THE PAST MONTH, HAVE YOU WISHED YOU WERE DEAD OR WISHED YOU COULD GO TO SLEEP AND NOT WAKE UP?: NO

## 2024-11-26 NOTE — ED PROVIDER NOTES
"  Emergency Department Note      History of Present Illness     Chief Complaint   Hand Pain      HPI   Isaiah Miller is a 69 year old male with history of CAD, hypertension, hyperlipidemia, anemia, and prediabetes who presents to the ED for evaluation of hand pain. The patient reports a right hand injury one week ago while letting the dog outside. His right hand got caught and was crushed in the door jamb. The right hand began swelling 2 days later and has continued to worsen spreading up the right upper extremity. Today, he states it feels like the hand \"wants to explode.\" Danial has tried Aleve, icing, and epsom salt with no pain relief. Denies fever, chills, chest pain, shortness of breath, nausea, vomiting, or history of diabetes. No other injuries, numbness in the right hand, or right elbow pain. Patient is not anticoagulated. He was not seen until earlier today. X-Rays were taken in clinic earlier today and provider told him the hand was not broken. Reports tetanus is up to date.     Independent Historian   None    Review of External Notes   I reviewed Dr. Gutierrez's [facility] Visit from earlier today where patient presented for a right wrist injury and hand pain. Patient referred to the ED for possible compartment syndrome.     Past Medical History     Medical History and Problem List   CAD  Hypertension   Hyperlipidemia   Esophageal reflux  Cardiovascular disease  Upper GI bleed  Anemia  Baltazar's esophagus with dysplagia   Benign paroxysmal positional vertigo   Obstructive sleep apnea  Prediabetes  Lumbago   Sciatica  CVD  Venous ulcer    Medications   Metoprolol succinate  Naphazoline-pheniramine  Pantoprazole  Simvastatin   Ezetimibe     Surgical History   EGD, combined   Carpal tunnel release, left   Colonoscopy   Appendectomy   Vein stripping, bilateral  Gastric fundoplication  Coronary stent placement x5  Tonsillectomy    Physical Exam     Patient Vitals for the past 24 hrs:   BP Temp Temp src " Pulse Resp SpO2   11/26/24 1107 (!) 156/86 97.5  F (36.4  C) Temporal 79 18 97 %     Physical Exam  General: Resting on the bed.  Head: No obvious trauma to head.  Ears, Nose, Throat:  External ears normal.  Nose normal.    Eyes:  Conjunctivae clear.  Pupils are equal, round, and reactive.   Neck: Normal range of motion.  Neck supple.   CV: Regular rate and rhythm.  No murmurs.      Respiratory: Effort normal and breath sounds normal.  No wheezing or crackles.   Gastrointestinal: Soft.  No distension. There is no tenderness.    Musculoskeletal: Right hand with appreciable swelling to the dorsum of the hand.  No laceration.  Range of motion of the fingers intact.  Limited range of motion of the wrist with flexion extension secondary to pain and swelling.  Sensation intact to all fingers.  Cap refill brisk in all 5 fingers.  2+ radial pulses.  Neuro: Alert. Moving all extremities appropriately.  Normal speech.    Skin: Skin is warm and dry.  No rash noted.   Diagnostics     Lab Results   Labs Ordered and Resulted from Time of ED Arrival to Time of ED Departure - No data to display    Imaging   US Upper Extremity Venous Duplex Right   Final Result   IMPRESSION: Negative for DVT in the right upper extremity.      JOSE ATKINSON MD            SYSTEM ID:  [id number]          Independent Interpretation   X-ray hand wrist shows no acute fracture     ED Course      Medications Administered   Medications   acetaminophen (TYLENOL) tablet 975 mg (975 mg Oral $Given 11/26/24 1215)   oxyCODONE (ROXICODONE) tablet 5 mg (5 mg Oral $Given 11/26/24 1215)       Procedures   Procedures     Discussion of Management   None    ED Course   ED Course as of 11/26/24 1858 Tue Nov 26, 2024   1144 I obtained history and examined the patient as noted above.    1539 I reassessed the patient.       1540 I spoke with Niki from [facility] who agrees with plan for splint and follow up.         Additional Documentation  None    Medical Decision Making /  Diagnosis     CMS Diagnoses: None    MIPS       None    [facility]   Isaiah Miller is a 69 year old male who presents emergency department with hand swelling.  Vital signs are reassuring.  Broad differential was considered including but not limited to fracture, dislocation, compartment syndrome, cellulitis, abscess, DVT, etc.  Overall patient is well-appearing nontoxic.  X-rays were obtained from outside facility.  Fortunately no evidence of fracture or subluxation.  Possible transverse fracture to the fifth proximal phalanx but patient has no tenderness there.  Therefore does not appear to be acute fracture.  Neurovasc intact distal.  There is some soft tissue swelling along the dorsum of the hand the patient has brisk cap refills, sensation is intact, 2+ radial pulses.  This does not appear clinically consistent with compartment syndrome.  Patient also is able to range both fingers and wrist without significant limitation as well.  There is no appreciable cellulitis or abscess on examination.  I do not suspect septic joint as this is posttraumatic in nature and seems more related to swelling than pain at the joint site itself.  I spoke with [facility] and they agree with splinting, compression, follow-up with orthopedics.  Given swelling patient had Ace wrap applied.  Given a Velcro wrist splint for home.  Ice, elevate.  Discussed return precaution including numbness weakness or increasing pain.  Return precautions given close follow-up with Ortho recommended.  Patient was discharged.    Disposition   The patient was discharged.     Diagnosis     ICD-10-CM    1. Hand swelling  M79.89       2. Contusion of soft tissue  T14.8XXA            Discharge Medications   Discharge Medication List as of 11/26/2024  3:37 PM        START taking these medications    Details   oxyCODONE (ROXICODONE) 5 MG tablet Take 1 tablet (5 mg) by mouth every 6 hours as needed for severe pain., Disp-6 tablet, R-0, E-Prescribe               Scribe  Disclosure:  I, Kinjal Stewarteusebia, am serving as a scribe at 11:50 AM on 11/26/2024 to document services personally performed by Sanjuana Rivera MD based on my observations and the provider's statements to me.        Sanjuana Rivera MD  11/26/24 [id number]

## 2024-11-26 NOTE — ED TRIAGE NOTES
Arrives ambulatory from home.  1 week ago his hand was crushed against the door.  was seen at , and was told the hand is not broken via xray.     Hand is swollen and was told to present here.

## 2024-11-26 NOTE — DISCHARGE INSTRUCTIONS
Ice, elevate your hand.  May use tylenol and ibuprofen for pain.  Use oxycodone as needed for severe pain.     Return to the ER if severe pain, discoloration of finger tips, worsening numbness    Follow up with orthopedics next week

## 2025-01-02 ENCOUNTER — DOCUMENTATION ONLY (OUTPATIENT)
Dept: EMERGENCY MEDICINE | Facility: CLINIC | Age: 70
End: 2025-01-02

## 2025-01-02 DIAGNOSIS — S60.221A CONTUSION OF RIGHT HAND, INITIAL ENCOUNTER: Primary | ICD-10-CM

## (undated) DEVICE — TUBING SUCTION MEDI-VAC SOFT 3/16"X20' N520A

## (undated) DEVICE — BAG CLEAR TRASH 1.3M 39X33" P4040C

## (undated) DEVICE — SUCTION MANIFOLD NEPTUNE 2 SYS 4 PORT 0702-020-000

## (undated) DEVICE — GOWN XLG DISP 9545

## (undated) DEVICE — SOL WATER IRRIG 1000ML BOTTLE 2F7114

## (undated) RX ORDER — FENTANYL CITRATE 50 UG/ML
INJECTION, SOLUTION INTRAMUSCULAR; INTRAVENOUS
Status: DISPENSED
Start: 2023-11-29